# Patient Record
Sex: FEMALE | Race: WHITE | Employment: OTHER | URBAN - METROPOLITAN AREA
[De-identification: names, ages, dates, MRNs, and addresses within clinical notes are randomized per-mention and may not be internally consistent; named-entity substitution may affect disease eponyms.]

---

## 2021-04-14 LAB
CHOLESTEROL, TOTAL: 197 MG/DL
CHOLESTEROL/HDL RATIO: 3.8
GLUCOSE BLD-MCNC: 86 MG/DL
HDLC SERPL-MCNC: 52 MG/DL (ref 35–70)
LDL CHOLESTEROL CALCULATED: 103 MG/DL (ref 0–160)
NONHDLC SERPL-MCNC: NORMAL MG/DL
TRIGL SERPL-MCNC: 212 MG/DL
VLDLC SERPL CALC-MCNC: NORMAL MG/DL

## 2021-08-17 ENCOUNTER — OFFICE VISIT (OUTPATIENT)
Dept: INTERNAL MEDICINE CLINIC | Age: 66
End: 2021-08-17
Payer: MEDICARE

## 2021-08-17 VITALS
SYSTOLIC BLOOD PRESSURE: 138 MMHG | HEART RATE: 56 BPM | BODY MASS INDEX: 27.25 KG/M2 | WEIGHT: 159.6 LBS | HEIGHT: 64 IN | DIASTOLIC BLOOD PRESSURE: 78 MMHG

## 2021-08-17 DIAGNOSIS — M47.812 SPONDYLOSIS OF CERVICAL REGION WITHOUT MYELOPATHY OR RADICULOPATHY: Primary | ICD-10-CM

## 2021-08-17 DIAGNOSIS — Z71.89 COUNSELING ON HEALTH PROMOTION AND DISEASE PREVENTION: ICD-10-CM

## 2021-08-17 DIAGNOSIS — E03.9 ACQUIRED HYPOTHYROIDISM: ICD-10-CM

## 2021-08-17 PROBLEM — M54.2 CERVICALGIA: Status: RESOLVED | Noted: 2021-08-17 | Resolved: 2021-08-17

## 2021-08-17 PROBLEM — M54.2 CERVICALGIA: Status: ACTIVE | Noted: 2021-08-17

## 2021-08-17 PROCEDURE — 99204 OFFICE O/P NEW MOD 45 MIN: CPT | Performed by: INTERNAL MEDICINE

## 2021-08-17 RX ORDER — LEVOTHYROXINE SODIUM 0.1 MG/1
100 TABLET ORAL DAILY
Status: SHIPPED | COMMUNITY
Start: 2021-08-17 | End: 2021-11-23 | Stop reason: SDUPTHER

## 2021-08-17 SDOH — ECONOMIC STABILITY: FOOD INSECURITY: WITHIN THE PAST 12 MONTHS, THE FOOD YOU BOUGHT JUST DIDN'T LAST AND YOU DIDN'T HAVE MONEY TO GET MORE.: NEVER TRUE

## 2021-08-17 SDOH — ECONOMIC STABILITY: FOOD INSECURITY: WITHIN THE PAST 12 MONTHS, YOU WORRIED THAT YOUR FOOD WOULD RUN OUT BEFORE YOU GOT MONEY TO BUY MORE.: NEVER TRUE

## 2021-08-17 ASSESSMENT — ENCOUNTER SYMPTOMS
SORE THROAT: 0
CHEST TIGHTNESS: 0
ABDOMINAL PAIN: 0
NAUSEA: 0
CONSTIPATION: 0
BACK PAIN: 0
PHOTOPHOBIA: 0
COUGH: 0
COLOR CHANGE: 0
TROUBLE SWALLOWING: 0
VOMITING: 0
WHEEZING: 0
VISUAL CHANGE: 0
SHORTNESS OF BREATH: 0

## 2021-08-17 ASSESSMENT — SOCIAL DETERMINANTS OF HEALTH (SDOH): HOW HARD IS IT FOR YOU TO PAY FOR THE VERY BASICS LIKE FOOD, HOUSING, MEDICAL CARE, AND HEATING?: NOT HARD AT ALL

## 2021-08-17 ASSESSMENT — PATIENT HEALTH QUESTIONNAIRE - PHQ9
1. LITTLE INTEREST OR PLEASURE IN DOING THINGS: 0
SUM OF ALL RESPONSES TO PHQ QUESTIONS 1-9: 0
SUM OF ALL RESPONSES TO PHQ9 QUESTIONS 1 & 2: 0
SUM OF ALL RESPONSES TO PHQ QUESTIONS 1-9: 0
SUM OF ALL RESPONSES TO PHQ QUESTIONS 1-9: 0
2. FEELING DOWN, DEPRESSED OR HOPELESS: 0

## 2021-08-17 NOTE — PROGRESS NOTES
to space these vaccines. Patient verbalized understanding and can get the pneumonia vaccine through her pharmacy or call the office and get it here once she makes up her mind      Return in about 3 months (around 11/17/2021). SUBJECTIVE  HPI:   Patient here today to establish new patient office visits previous PCP in [facility] through [facility].  She used to live in [place] however this is more convenient location for her since she moved to [facility] PCP evaluation April 2021 for annual physical with complete lab work. 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 Pain has not been disabling and not to the point where she needs to take medications however it has been bothersome and causing some distress. Does not recall any history of injury, she denies any tingling or numbness, she does have history of left carpal tunnel that was surgically repaired and has been asymptomatic since      Neck Pain   This is a chronic problem. The current episode started more than 1 year ago. The problem occurs intermittently. The problem has been waxing and waning. The quality of the pain is described as aching. The pain is at a severity of 5/10. The pain is moderate. The symptoms are aggravated by position and twisting. The pain is worse during the day. Pertinent negatives include no chest pain, fever, headaches, leg pain, numbness, pain with swallowing, paresis, photophobia, syncope, tingling, trouble swallowing, visual change, weakness or weight loss. She has tried chiropractic manipulation, ice and NSAIDs for the symptoms. The treatment provided moderate relief.        Review of Systems   Constitutional: Negative for activity change, appetite change, fatigue, fever and weight loss. HENT: Negative for congestion, hearing loss, mouth sores, sore throat and trouble swallowing. Eyes: Negative for photophobia. Respiratory: Negative for cough, chest tightness, shortness of breath and wheezing. Cardiovascular: Negative for chest pain, palpitations, leg swelling and syncope. Gastrointestinal: Negative for abdominal pain, constipation, nausea and vomiting. Endocrine: Negative for cold intolerance and heat intolerance. Genitourinary: Negative for difficulty urinating, dysuria, frequency, hematuria and urgency. Musculoskeletal: Positive for neck pain. Negative for arthralgias, back pain, gait problem, joint swelling and myalgias. Skin: Negative for color change. Allergic/Immunologic: Negative for environmental allergies and immunocompromised state. Neurological: Negative for dizziness, tingling, weakness, light-headedness, numbness and headaches. Hematological: Does not bruise/bleed easily. Psychiatric/Behavioral: Negative for behavioral problems and dysphoric mood. The patient is not nervous/anxious. OBJECTIVE:    /78 (Site: Right Upper Arm, Position: Sitting, Cuff Size: Medium Adult)   Pulse 56   Ht 5' 4\" (1.626 m)   Wt 159 lb 9.6 oz (72.4 kg)   BMI 27.40 kg/m²    Physical Exam  Constitutional:       General: She is not in acute distress. Appearance: Normal appearance. She is normal weight. She is not toxic-appearing. HENT:      Head: Normocephalic. Eyes:      Conjunctiva/sclera: Conjunctivae normal.   Cardiovascular:      Rate and Rhythm: Normal rate. Heart sounds: Normal heart sounds. Pulmonary:      Effort: Pulmonary effort is normal. No respiratory distress. Abdominal:      Palpations: Abdomen is soft. Musculoskeletal:         General: Normal range of motion. Cervical back: Neck supple. Tenderness present. No rigidity.    Lymphadenopathy:      Cervical: No cervical adenopathy. Skin:     General: Skin is warm and dry. Neurological:      General: No focal deficit present. Mental Status: She is alert. Cranial Nerves: No cranial nerve deficit. Psychiatric:         Mood and Affect: Mood normal.           Electronically signed by Gisel Cortez MD on 8/17/2021 at 11:07 AM.    This dictation was generated by voice recognition computer software. Although all attempts are made to edit the dictation for accuracy, there may be errors in the transcription that are not intended.

## 2021-08-17 NOTE — ASSESSMENT & PLAN NOTE
Has been stable on current dose of levothyroxine 100 MCG for the past few years, last lab work done in April 2021 reviewed in care everywhere, will continue current dose and will probably recheck lab work next visit and adjust dose if needed. Patient is compliant with medication.

## 2021-08-17 NOTE — ASSESSMENT & PLAN NOTE
Recommendations for regular level of physical activity as daily walking and maintaining healthy weight diet and regular exercise discussed with patient  Patient is up-to-date with colonoscopy, mammography, DEXA bone density  Patient did complete Covid vaccine however she is overdue for pneumonia vaccine and shingles vaccine. Recommendations made to patient, she wants to think about obtaining the shots however urged to at least get the pneumonia shots since flu season is approaching and she will also most likely require a Covid booster shot and advised it is better to space these vaccines.   Patient verbalized understanding and can get the pneumonia vaccine through her pharmacy or call the office and get it here once she makes up her mind

## 2021-08-23 ENCOUNTER — HOSPITAL ENCOUNTER (OUTPATIENT)
Dept: PHYSICAL THERAPY | Age: 66
Setting detail: THERAPIES SERIES
Discharge: HOME | End: 2021-08-23
Payer: MEDICARE

## 2021-08-23 PROCEDURE — 97140 MANUAL THERAPY 1/> REGIONS: CPT

## 2021-08-23 PROCEDURE — 97161 PT EVAL LOW COMPLEX 20 MIN: CPT

## 2021-08-23 PROCEDURE — 97110 THERAPEUTIC EXERCISES: CPT

## 2021-08-23 NOTE — PLAN OF CARE
[address] [address], [address]  Phone: [phone]   Fax:     [phone]                                                       Physical Therapy Certification    Dear Referring Practitioner: Antonietta Augustine MD,    We had the pleasure of evaluating the following patient for physical therapy services at [address] [place] [address]. A summary of our findings can be found in the initial assessment below. This includes our plan of care. If you have any questions or concerns regarding these findings, please do not hesitate to contact me at the office phone number checked above. Thank you for the referral.       Physician Signature:_______________________________Date:__________________  By signing above (or electronic signature), therapists plan is approved by physician      Patient: [place]   : 1955   MRN: [id number]  Referring Physician: Referring Practitioner: Antonietta Augustine MD      Evaluation Date: 2021      Medical Diagnosis Information:  Diagnosis: Spondylosis of cervical region without myelopathy or radiculopathy   Treatment Diagnosis: Decrease cervical AROM, cervical joint restriction, left shoulder weakness                                         Insurance information: PT Insurance Information: [employer]    Precautions/ Contra-indications:   Latex Allergy:  [x]NO      []YES  Preferred Language for Healthcare:   [x]English       []Other:    C-SSRS Triggered by Intake questionnaire (Past 2 wk assessment ):   [x] No, Questionnaire did not trigger screening.   [] Yes, Patient intake triggered C-SSRS Screening     [] Completed, no further action required. [] Completed, PCP notified via Epic    SUBJECTIVE: Patient stated complaint: Patient reports that she's neck pain for a year with an insidious onset. She had a course of Chiropractic care for several sessions last year which helped some.  Says she continues to have pain with turning her head to the left especially during driving . Her goal is to improve cervical spine pain and increase overall active ROM. Fear avoidance: I should not do physical activities that (might) make my pain worse   [] True   [x] False     Relevant Medical History: Hypothyroidism, CTS B, Carpal tunnel sx on Left wrist     Functional Outcome: NDI: raw score = ; dysfunction = %    Pain Scale: 4/10  Easing factors: Meds  Provocative factors: turning head to the left     Type: []Constant   [x]Intermittent  []Radiating []Localized []other:     Numbness/Tingling:  Intermittent     Occupation/School: Retired     Living Status/Prior Level of Function: Prior to this injury / incident, pt was independent with ADLs and IADLs, Patient lives in one level home. Prior to a year ago pt had no neck pain     OBJECTIVE:     Palpation: Tenderness over left CT junction     Functional Mobility/Transfers: independent    Posture: rounded , forward shoulders     Bandages/Dressings/Incisions:     Gait: (include devices/WB status):  WNL     Dermatomes Normal Abnormal Comments   Top of head (C1) x     Posterior occipital region (C2) x     Side of neck (C3) x     Top of shoulder (C4) x     Lateral deltoid (C5) x     Tip of thumb (C6) x     Distal middle finger (C7) x     Distal fifth finger (C8) x     Medial forearm (T1) x     Lower extremity x         Reflexes Normal Abnormal Comments   C5-6 Biceps   All NT this date    C5-6 Brachioradialis      C7-8 Triceps      Polos      S1-2 Seated achilles      S1-2 Prone knee bend      L3-4 Patellar tendon      Clonus      Babinski          CERV ROM Left  Right    Cervical Flexion         65 pain    Cervical Extension 45    Cervical SB 14 pain(mild) 34 pain (mild-L)   Cervical rotation 36 pain 77        ROM Left Right   Shoulder Flex WFL all WFL all    Shoulder Abd     Shoulder ER     Shoulder IR               Strength / Myotomes Left Right   Cervical Flexion (C1-2) 4 5 Psychological Disorders  []Anxiety (F41.9)  []Depression (F32.9)   []Other:   Developmental Disorders  []Autism (F84.0)  []CP (G80)  []Down Syndrome (Q90.9)  []Developmental delay     Neurological conditions  []Prior Stroke (I69.30)  []Parkinson's (G20)  []Encephalopathy (G93.40)  []MS (G35)  []Post-polio (G14)  []SCI  []TBI  []ALS Other conditions  []Fibromyalgia (M79.7)  []Vertigo  []Syncope  []Kidney Failure  []Cancer      []currently undergoing                treatment  []Pregnancy  []Incontinence   Prior surgeries  []involved limb  []previous spinal surgery  [] section birth  []hysterectomy  []bowel / bladder surgery  []other relevant surgeries   []Other:              Barriers to/and or personal factors that will affect rehab potential:              []Age  []Sex   []Smoker              []Motivation/Lack of Motivation                        []Co-Morbidities              []Cognitive Function, education/learning barriers              []Environmental, home barriers              []profession/work barriers  []past PT/medical experience  []other:  Justification:    Falls Risk Assessment (30 days):  [x] Falls Risk assessed and no intervention required. [] Falls Risk assessed and Patient requires intervention due to being higher risk   TUG score (>12s at risk):     [] Falls education provided, including         ASSESSMENT: The patient is a 76 yo female who presents with cervical extension/facet pattern. She pain with left side bending, extension,&  Left rotation,flexion .  Spurlings test is negative with mild weakness with Left shoulder     Functional Impairments:     []Noted cervical/thoracic/GHJ joint hypomobility   []Noted cervical/thoracic/GHJ joint hypermobility   [x]Decreased cervical/UE functional ROM   []Noted Headache pain aggravated by neck movements with/without dizziness   []Abnormal reflexes/sensation/myotomal/dermatomal deficits   []Decreased DCF control or ability to hold head up   []Decreased RC/scapular/core strength and neuromuscular control    []Decreased UE functional strength   []other:      Functional Activity Limitations (from functional questionnaire and intake)   []Reduced ability to tolerate prolonged functional positions   []Reduced ability or difficulty with changes of positions or transfers between positions   []Reduced ability to maintain good posture and demonstrate good body mechanics with sitting, bending, and lifting   [] Reduced ability or tolerance with driving and/or computer work   []Reduced ability to perform lifting, reaching, carrying tasks   []Reduced ability to concentrate   []Reduced ability to sleep    []Reduced ability to tolerate any impact through UE or spine   []Reduced ability to ambulate prolonged functional periods/distances   []other:    Participation Restrictions   []Reduced participation in self care activities   []Reduced participation in home management activities   []Reduced participation in work activities   [x]Reduced participation in social activities. [x]Reduced participation in sport/recreational activities.     Classification/Subgrouping:   []signs/symptoms consistent with neck pain with mobility deficits     [x]signs/symptoms consistent with neck pain with movement coordinated impairments    []signs/symptoms consistent with neck pain with radiating pain    []signs/symptoms consistent with neck pain with headaches (cervicogenic)    []Signs/symptoms consistent with nerve root involvement including myotome & dermatome dysfunction   [x]sign/symptoms consistent with facet dysfunction of cervical and thoracic spine    []signs/symptoms consistent suggesting central cord compression/UMN syndromes   []signs/symptoms consistent with discogenic cervical pain   []signs/symptoms consistent with rib dysfunction   []signs/symptoms consistent with postural dysfunction   []signs/symptoms consistent with shoulder pathology    []signs/symptoms consistent with post-surgical status including decreased ROM, strength and function. []signs/symptoms consistent with pathology which may benefit from Dry Needling   []signs/symptoms which may limit the use of advanced manual therapy techniques: (Hypertension, recent trauma, intolerance to end range positions, prior TIA, visual issues, UE myotomes loss )     Prognosis/Rehab Potential:      [x]Excellent   []Good    []Fair   []Poor    Tolerance of evaluation/treatment:    [x]Excellent   []Good    []Fair   []Poor    Physical Therapy Evaluation Complexity Justification  [x] A history of present problem with:  [x] no personal factors and/or comorbidities that impact the plan of care;  []1-2 personal factors and/or comorbidities that impact the plan of care  []3 personal factors and/or comorbidities that impact the plan of care  [x] An examination of body systems using standardized tests and measures addressing any of the following: body structures and functions (impairments), activity limitations, and/or participation restrictions;:  [x] a total of 1-2 or more elements   [] a total of 3 or more elements   [] a total of 4 or more elements   [x] A clinical presentation with:  [x] stable and/or uncomplicated characteristics   [] evolving clinical presentation with changing characteristics  [] unstable and unpredictable characteristics;   [x] Clinical decision making of [x] low, [] moderate, [] high complexity using standardized patient assessment instrument and/or measurable assessment of functional outcome. [x] EVAL (LOW) 29331 (typically 20 minutes face-to-face)  [] EVAL (MOD) 71524 (typically 30 minutes face-to-face)  [] EVAL (HIGH) 36556 (typically 45 minutes face-to-face)  [] RE-EVAL     PLAN:   Frequency/Duration:  2 days per week for 5 Weeks:  Interventions:  [x]  Therapeutic exercise including: strength training, ROM, for cervical spine,scapula, core and Upper extremity, including postural re-education.    [x]  NMR activation and able to improve with left rotation /reduce pain with driving.     [] Progressing: [] Met: [] Not Met: [] Adjusted     Electronically signed by:  Gali Yeung PT

## 2021-08-23 NOTE — FLOWSHEET NOTE
[facility] Physical Therapy  Phone: [phone]   Fax: [phone]    Physical Therapy Treatment Note/ Progress Report:     Date:  2021    Patient Name:  Kenrick Saha    :  1955  MRN: [id number]  Restrictions/Precautions:    Medical/Treatment Diagnosis Information:  · Diagnosis: Spondylosis of cervical region without myelopathy or radiculopathy  · Treatment Diagnosis: Decrease cervical AROM, cervical joint restriction, left shoulder weakness  Insurance/Certification information:  PT Insurance Information: [employer]  Physician Information:  Referring Practitioner: Rowdy Will MD  Plan of care signed (Y/N): []  Yes [x]  No     Date of Patient follow up with Physician:      Progress Report: []  Yes  [x]  No     Date Range for reporting period:  Beginning:   Ending:     Progress report due (10 Rx/or 30 days whichever is less): visit #68     Recertification due (POC duration/ or 90 days whichever is less): visit #     Visit # Insurance Allowable Auth required?  Date Range    Mn []  Yes  [x]  No        Latex Allergy:  [x]NO      []YES  Preferred Language for Healthcare:   [x]English       []other:    Functional Scale:         Date assessed:  NDI: raw score = 8; dysfunction =16 %                                  2021       Pain level:  0/10     SUBJECTIVE:  See eval    OBJECTIVE: See eval   Observation:    Test measurements:      RESTRICTIONS/PRECAUTIONS:     Exercises/Interventions:   Therapeutic Exercise (43877) Resistance / level Sets/sec Reps Notes   UBE       Mid Row       High Row       3 finger cerv rotation       CT                                   Therapeutic Activities (92768)                                                 NMR re-education (62567)                                          Manual Intervention (09114)       Cerv mobs/manip       Thoracic mobs/manip       CT manip       Rib mobilizations       STM                  Modalities:     Patient education:  -pt educated on diagnosis, prognosis and expectations for rehab  -all pt questions were answered    Home Exercise Program:   Access Code: [email or web address]  URL: [email or web address]  Date: 08/23/2021  Prepared by: Cherie Lucero    Exercises  Seated Scapular Retraction - 1 x daily - 7 x weekly - 3 sets - 10 reps - 2 hold  Seated Passive Cervical Retraction - 1 x daily - 7 x weekly - 3 sets - 10 reps - 2 hold      Therapeutic Exercise and NMR EXR  [] (11802) Provided verbal/tactile cueing for activities related to strengthening, flexibility, endurance, ROM  for improvements in cervical, postural, scapular, scapulothoracic and UE control with self care, reaching, carrying, lifting, house/yardwork, driving/computer work.    [] (58423) Provided verbal/tactile cueing for activities related to improving balance, coordination, kinesthetic sense, posture, motor skill, proprioception  to assist with cervical, scapular, scapulothoracic and UE control with self care, reaching, carrying, lifting, house/yardwork, driving/computer work.  [] (38879) Therapist is in constant attendance of 2 or more patients providing skilled therapy interventions, but not providing any significant amount of measurable one-on-one time to either patient, for improvements in cervical, scapular, scapulothoracic and UE control with self care, reaching, carrying, lifting, house/yardwork, driving, computer work. Therapeutic Activities:    [] (97497 or 33934) Provided verbal/tactile cueing for activities related to improving balance, coordination, kinesthetic sense, posture, motor skill, proprioception and motor activation to allow for proper function of cervical, scapular, scapulothoracic and UE control with self care, carrying, lifting, driving/computer work.      Home Exercise Program:    [x] (50527) Reviewed/Progressed HEP activities related to strengthening, flexibility, endurance, ROM of cervical, scapular, scapulothoracic and UE Poor    Patient Requires Follow-up: [x] Yes  [] No    PLAN: See eval. PT 2x / week for 5 weeks. [] Continue per plan of care [] Alter current plan (see comments)  [x] Plan of care initiated [] Hold pending MD visit [] Discharge    Electronically signed by: Veronika Osborne PT       Note: If patient does not return for scheduled/ recommended follow up visits, this note will serve as a discharge from care along with most recent update on progress.

## 2021-08-27 ENCOUNTER — HOSPITAL ENCOUNTER (OUTPATIENT)
Dept: PHYSICAL THERAPY | Age: 66
Setting detail: THERAPIES SERIES
Discharge: HOME | End: 2021-08-27
Payer: MEDICARE

## 2021-08-27 PROCEDURE — 97110 THERAPEUTIC EXERCISES: CPT

## 2021-08-27 PROCEDURE — 97140 MANUAL THERAPY 1/> REGIONS: CPT

## 2021-08-27 NOTE — FLOWSHEET NOTE
[address] [facility] Physical Therapy  Phone: [phone]   Fax: [phone]    Physical Therapy Treatment Note/ Progress Report:     Date:  2021    Patient Name:  Maribel Lees    :  1955  MRN: [id number]  Restrictions/Precautions:    Medical/Treatment Diagnosis Information:  · Diagnosis: Spondylosis of cervical region without myelopathy or radiculopathy  · Treatment Diagnosis: Decrease cervical AROM, cervical joint restriction, left shoulder weakness  Insurance/Certification information:  PT Insurance Information: [employer]  Physician Information:  Referring Practitioner: Yun Anderson MD  Plan of care signed (Y/N): []  Yes [x]  No     Date of Patient follow up with Physician:      Progress Report: []  Yes  [x]  No     Date Range for reporting period:  Beginning:   Ending:     Progress report due (10 Rx/or 30 days whichever is less): visit #02     Recertification due (POC duration/ or 90 days whichever is less): visit #     Visit # Insurance Allowable Auth required?  Date Range    Mn []  Yes  [x]  No        Latex Allergy:  [x]NO      []YES  Preferred Language for Healthcare:   [x]English       []other:    Functional Scale:         Date assessed:  NDI: raw score = 8; dysfunction =16 %                                  2021       Pain level:  0/10     SUBJECTIVE:  Patient reports that she has been trying to perform  HEP     OBJECTIVE: See eval   Observation:    Test measurements:      RESTRICTIONS/PRECAUTIONS:     Exercises/Interventions:   Therapeutic Exercise (17006) Resistance / level Sets/sec Reps Notes   UBE  2/2 fwd/bwd     Mid Row       High Row       3 finger cerv rotation   10   added   CT supine   10                                Therapeutic Activities (13038)                                                 NMR re-education (90707)                                          Manual Intervention (88798)       Cerv mobs/manip       Thoracic mobs/manip       CT manip prone, Direct extension mob sup, Mid-cervical locking supine, cervical manual traction  X 25 min     Rib mobilization- assess left 1st rib NPV       STM                  Modalities:     Patient education:  -pt educated on diagnosis, prognosis and expectations for rehab  -all pt questions were answered    Home Exercise Program:   Access Code: [email or web address]  URL: [email or web address]  Date: 08/23/2021  Prepared by: Ashlyn Manley    Exercises  Seated Scapular Retraction - 1 x daily - 7 x weekly - 3 sets - 10 reps - 2 hold  Seated Passive Cervical Retraction - 1 x daily - 7 x weekly - 3 sets - 10 reps - 2 hold      Therapeutic Exercise and NMR EXR  [] (77388) Provided verbal/tactile cueing for activities related to strengthening, flexibility, endurance, ROM  for improvements in cervical, postural, scapular, scapulothoracic and UE control with self care, reaching, carrying, lifting, house/yardwork, driving/computer work.    [] (11715) Provided verbal/tactile cueing for activities related to improving balance, coordination, kinesthetic sense, posture, motor skill, proprioception  to assist with cervical, scapular, scapulothoracic and UE control with self care, reaching, carrying, lifting, house/yardwork, driving/computer work.  [] (28505) Therapist is in constant attendance of 2 or more patients providing skilled therapy interventions, but not providing any significant amount of measurable one-on-one time to either patient, for improvements in cervical, scapular, scapulothoracic and UE control with self care, reaching, carrying, lifting, house/yardwork, driving, computer work.      Therapeutic Activities:    [] (89404 or 89955) Provided verbal/tactile cueing for activities related to improving balance, coordination, kinesthetic sense, posture, motor skill, proprioception and motor activation to allow for proper function of cervical, scapular, scapulothoracic and UE control with self care, carrying, lifting, driving/computer work. Home Exercise Program:    [x] (83968) Reviewed/Progressed HEP activities related to strengthening, flexibility, endurance, ROM of cervical, scapular, scapulothoracic and UE control with self care, reaching, carrying, lifting, house/yardwork, driving/computer work  [] (54518) Reviewed/Progressed HEP activities related to improving balance, coordination, kinesthetic sense, posture, motor skill, proprioception of cervical, scapular, scapulothoracic and UE control with self care, reaching, carrying, lifting, house/yardwork, driving/computer work      Manual Treatments:  PROM / STM / Oscillations-Mobs:  G-I, II, III, IV (PA's, Inf., Post.)  [] (06024) Provided manual therapy to mobilize soft tissue/joints of cervical/CT, scapular GHJ and UE for the purpose of decreasing headache, modulating pain, promoting relaxation,  increasing ROM, reducing/eliminating soft tissue swelling/inflammation/restriction, improving soft tissue extensibility and allowing for proper ROM for normal function with self care, reaching, carrying, lifting, house/yardwork, driving/computer work      Charges:  Timed Code Treatment Minutes: 39   Total Treatment Minutes: 39       [] EVAL - LOW (70546)   [] EVAL - MOD (03427)  [] EVAL - HIGH (63267)  [] RE-EVAL (47454)  [x] LEUNG(56779) x  2      [] NMR (00201) x      [] Ionto  [x] Manual (21767) x 1     [] Ultrasound  [] TA x       [] Mech Traction (80542)  [] Home Management Training x   [] ES (un) (80352):           [] Aquatic    [] ES(attended) (21590)   [] Group    [] Other:    GOALS:  Patient stated goal: To improve cervical AROM   []? Progressing: []? Met: []? Not Met: []? Adjusted     Therapist goals for Patient:   Short Term Goals: To be achieved in: 2 weeks  1. Independent in HEP and progression per patient tolerance, in order to prevent re-injury. []? Progressing: []? Met: []? Not Met: []? Adjusted  2.  Patient will have a decrease in pain to facilitate improvement in movement, function, and ADLs as indicated by Functional Deficits. []? Progressing: []? Met: []? Not Met: []? Adjusted     Long Term Goals: To be achieved in: 5 weeks  1. Disability index score of 10% or less for the NDI to assist with reaching prior level of function. []? Progressing: []? Met: []? Not Met: []? Adjusted  2. Patient will demonstrate increased AROM to cervical in left SB, rotation, extensionWFL of cervical/thoracic spine to allow for proper joint functioning as indicated by patients Functional Deficits. []? Progressing: []? Met: []? Not Met: []? Adjusted  3. Patient will demonstrate an increase in postural awareness and control and activation of  Deep cervical stabilizers to allow for proper functional mobility as indicated by patients Functional Deficits. []? Progressing: []? Met: []? Not Met: []? Adjusted  4. Patient will return to functional activities including  without increased symptoms or restriction. []? Progressing: []? Met: []? Not Met: []? Adjusted  5. Patient to be able to improve with left rotation /reduce pain with driving.    []? Progressing: []? Met: []? Not Met: []? Adjusted            Overall Progression Towards Functional goals/ Treatment Progress Update:  [] Patient is progressing as expected towards functional goals listed. [] Progression is slowed due to complexities/Impairments listed. [] Progression has been slowed due to co-morbidities.   [x] Plan just implemented, too soon to assess goals progression <30days   [] Goals require adjustment due to lack of progress  [] Patient is not progressing as expected and requires additional follow up with physician  [] Other    Persisting Functional Limitations/Impairments:  []Sitting []Standing   []Walking []Squatting/bending    []Stairs []ADL's    []Transfers []Reaching  []Housework []Lifting  [x]Driving []Job related tasks  []Sports/Recreation  []Sleeping  [x]Other: looking left     ASSESSMENT:  8/27: Patient assessed further with cervical AROM. Performed manual therapy mobilization to mid-cervical region left side. Patient presented with right scalene tightness, restricted left rotation, SB, extension with pain on the left. She also had slightly elevated B shoulders. Patient seemed to respond to all the above manual techniques with improved ROM at the end of session. Further assessment to consider is left 1st rib and left upper traps. Progress exercises as tolerated . Treatment/Activity Tolerance:  [] Patient able to complete tx  [] Patient limited by fatique  [] Patient limited by pain  [] Patient limited by other medical complications  [] Other:     Prognosis: [] Good [] Fair  [] Poor    Patient Requires Follow-up: [x] Yes  [] No    PLAN: See eval. PT 2x / week for 5 weeks. [] Continue per plan of care [] Alter current plan (see comments)  [x] Plan of care initiated [] Hold pending MD visit [] Discharge    Electronically signed by: Monalisa Holbrook, PT , DPT, OMT-C       Note: If patient does not return for scheduled/ recommended follow up visits, this note will serve as a discharge from care along with most recent update on progress.

## 2021-09-01 ENCOUNTER — HOSPITAL ENCOUNTER (OUTPATIENT)
Dept: PHYSICAL THERAPY | Age: 66
Setting detail: THERAPIES SERIES
Discharge: HOME | End: 2021-09-01
Payer: MEDICARE

## 2021-09-01 PROCEDURE — 97140 MANUAL THERAPY 1/> REGIONS: CPT

## 2021-09-01 PROCEDURE — 97110 THERAPEUTIC EXERCISES: CPT

## 2021-09-01 NOTE — FLOWSHEET NOTE
[address] [facility] Physical Therapy  Phone: [phone]   Fax: [phone]    Physical Therapy Treatment Note/ Progress Report:     Date:  2021    Patient Name:  Kenrick Saha    :  1955  MRN: [id number]  Restrictions/Precautions:    Medical/Treatment Diagnosis Information:  · Diagnosis: Spondylosis of cervical region without myelopathy or radiculopathy  · Treatment Diagnosis: Decrease cervical AROM, cervical joint restriction, left shoulder weakness  Insurance/Certification information:  PT Insurance Information: [employer]  Physician Information:  Referring Practitioner: Rowdy Will MD  Plan of care signed (Y/N): []  Yes [x]  No     Date of Patient follow up with Physician:      Progress Report: []  Yes  [x]  No     Date Range for reporting period:  Beginning:   Ending:     Progress report due (10 Rx/or 30 days whichever is less): visit #36     Recertification due (POC duration/ or 90 days whichever is less): visit #     Visit # Insurance Allowable Auth required? Date Range   3/12 [place] []  Yes  [x]  No        Latex Allergy:  [x]NO      []YES  Preferred Language for Healthcare:   [x]English       []other:    Functional Scale:         Date assessed:  NDI: raw score = 8; dysfunction =16 %                                  2021       Pain level:  2/10     SUBJECTIVE:  Patient reports she has had some soreness in the L UT region. \"The tendon feels tight. \" It is feel worst with turning her head to the L.     OBJECTIVE: See eval   Observation:    Test measurements:    CERV ROM Left  Right    Cervical Flexion         65 pain     Cervical Extension 45     Cervical SB 14 pain(mild) 34 pain (mild-L)   Cervical rotation 44 pain 77          RESTRICTIONS/PRECAUTIONS:     Exercises/Interventions:   Therapeutic Exercise (84398) Resistance / level Sets/sec Reps Notes   UBE  2/2 fwd/bwd     Scap retraction  2 10    Mid Row orange 2 10 9/   High Row orange 2 10 9/   LPD orange 2 10 9/   3 finger cerv rotation   10  8/27 added   CT supine   10    Levator scap stretch  15\" 2 9/1                        Therapeutic Activities (43277)                                                 NMR re-education (28150)                                          Manual Intervention ([id number])       Cerv mobs/manip       Thoracic mobs/manip       , Direct extension mob sup, Mid-cervical locking supine, cervical manual traction  X 20 min     Rib mobilization- assess left 1st rib NPV       STM UT, cervical paraspinals, suboccipitals  X 5min     Seated CT manip  X 2 min         Modalities: 9/1 MHP applied to c-spine with pt in supine x 10 min    Patient education:  -pt educated on diagnosis, prognosis and expectations for rehab  -all pt questions were answered    Home Exercise Program:   Access Code: [email or web address]  URL: [email or web address]  Date: 08/23/2021  Prepared by: Tish Jade    Exercises  Seated Scapular Retraction - 1 x daily - 7 x weekly - 3 sets - 10 reps - 2 hold  Seated Passive Cervical Retraction - 1 x daily - 7 x weekly - 3 sets - 10 reps - 2 hold    Updated 9/1  Access Code: [email or web address]  URL: [employer]/  Date: 09/01/2021  Prepared by: Jasvir January    Exercises  Standing Shoulder Row with Anchored Resistance - 1 x daily - 2 x weekly - 2 sets - 10 reps  Standing High Row with Resistance - 1 x daily - 2 x weekly - 2 sets - 10 reps  Shoulder extension with resistance - Neutral - 1 x daily - 2 x weekly - 2 sets - 10 reps        Therapeutic Exercise and NMR EXR  [x] (16852) Provided verbal/tactile cueing for activities related to strengthening, flexibility, endurance, ROM  for improvements in cervical, postural, scapular, scapulothoracic and UE control with self care, reaching, carrying, lifting, house/yardwork, driving/computer work.    [] (06995) Provided verbal/tactile cueing for activities related to improving balance, coordination, kinesthetic sense, posture, motor skill, proprioception work      Charges:  Timed Code Treatment Minutes: 45   Total Treatment Minutes: 55       [] EVAL - LOW (04280)   [] EVAL - MOD (17597)  [] EVAL - HIGH (22045)  [] RE-EVAL (79410)  [x] LP(47661) x  1      [] NMR (53941) x      [] Ionto  [x] Manual (91757) x 2     [] Ultrasound  [] TA x       [] Mech Traction (45613)  [] Home Management Training x   [] ES (un) (45963):           [] Aquatic    [] ES(attended) (52926)   [] Group    [] Other:    GOALS:  Patient stated goal: To improve cervical AROM   []? Progressing: []? Met: []? Not Met: []? Adjusted     Therapist goals for Patient:   Short Term Goals: To be achieved in: 2 weeks  1. Independent in HEP and progression per patient tolerance, in order to prevent re-injury. []? Progressing: []? Met: []? Not Met: []? Adjusted  2. Patient will have a decrease in pain to facilitate improvement in movement, function, and ADLs as indicated by Functional Deficits. []? Progressing: []? Met: []? Not Met: []? Adjusted     Long Term Goals: To be achieved in: 5 weeks  1. Disability index score of 10% or less for the NDI to assist with reaching prior level of function. []? Progressing: []? Met: []? Not Met: []? Adjusted  2. Patient will demonstrate increased AROM to cervical in left SB, rotation, extensionWFL of cervical/thoracic spine to allow for proper joint functioning as indicated by patients Functional Deficits. []? Progressing: []? Met: []? Not Met: []? Adjusted  3. Patient will demonstrate an increase in postural awareness and control and activation of  Deep cervical stabilizers to allow for proper functional mobility as indicated by patients Functional Deficits. []? Progressing: []? Met: []? Not Met: []? Adjusted  4. Patient will return to functional activities including  without increased symptoms or restriction. []? Progressing: []? Met: []? Not Met: []? Adjusted  5. Patient to be able to improve with left rotation /reduce pain with driving.    []?  Progressing: along with most recent update on progress.

## 2021-09-08 ENCOUNTER — HOSPITAL ENCOUNTER (OUTPATIENT)
Dept: PHYSICAL THERAPY | Age: 66
Setting detail: THERAPIES SERIES
Discharge: HOME | End: 2021-09-08
Payer: MEDICARE

## 2021-09-08 PROCEDURE — 97112 NEUROMUSCULAR REEDUCATION: CPT

## 2021-09-08 PROCEDURE — 97110 THERAPEUTIC EXERCISES: CPT

## 2021-09-08 PROCEDURE — 97140 MANUAL THERAPY 1/> REGIONS: CPT

## 2021-09-08 NOTE — FLOWSHEET NOTE
[address] [facility]  Phone: [phone]   Fax: [phone]    Physical Therapy Treatment Note/ Progress Report:     Date:  2021    Patient Name:  Sandy Beach    :  1955  MRN: [id number]  Restrictions/Precautions:    Medical/Treatment Diagnosis Information:  · Diagnosis: Spondylosis of cervical region without myelopathy or radiculopathy  · Treatment Diagnosis: Decrease cervical AROM, cervical joint restriction, left shoulder weakness  Insurance/Certification information:  PT [facility] Information[facility]  Physician Information:  Referring Practitioner: Antonietta Augustine MD  Plan of care signed (Y/N): []  Yes [x]  No     Date of Patient follow up with Physician:      Progress Report: []  Yes  [x]  No     Date Range for reporting period:  Beginnin21  Ending:     Progress report due (10 Rx/or 30 days whichever is less): visit #94     Recertification due (POC duration/ or 90 days whichever is less): visit #     Visit # Insurance Allowable Auth required? Date Range    Mn []  Yes  [x]  No        Latex Allergy:  [x]NO      []YES  Preferred Language for Healthcare:   [x]English       []other:     Functional Scale:         Date assessed:  NDI: raw score = 8; dysfunction =16 %                                  2021       Pain level:  2/10     SUBJECTIVE:  Patient reports she did a little sore after the last visit. She will be going camping next week and then [facility] the following week.      OBJECTIVE: See eval   Observation:    Test measurements:    CERV ROM Left  Right    Cervical Flexion         65 pain     Cervical Extension 45     Cervical SB 14 pain(mild) 34 pain (mild-L)   Cervical rotation 49 pain 77          RESTRICTIONS/PRECAUTIONS:     Exercises/Interventions:   Therapeutic Exercise (28566) Resistance / level Sets/sec Reps Notes   UBE  2/2 fwd/bwd     Scap retraction  2 10    Mid Row orange 2 10 9/1   High Row orange 2 10 9/1   LPD orange 2 10 9/1   3 finger cerv rotation     8/27 added   CT supine   10    Levator scap stretch  15\" 2 9/1                        Therapeutic Activities (47927)                                                 NMR re-education (69389)       Prone over swiss ball:  Shld Ext  Row  Horiz Abd    1  1   10  10 9/8   Prone retraction  2 10 9/8                        Manual Intervention (16155)       Cerv mobs/manip       Thoracic P-A mobs, thoracic distraction       , Direct extension mob sup, Mid-cervical locking supine, cervical manual traction  X 6 min     Rib mobilization- assess left 1st rib NPV       STM UT, cervical paraspinals, suboccipitals  X 5min     Seated CT manip  X 2 min         Modalities: 9/1 MHP applied to c-spine with pt in supine x 10 min    Patient education:  -pt educated on diagnosis, prognosis and expectations for rehab  -all pt questions were answered    Home Exercise Program:   Access Code: [email or web address]  URL: [email or web address]  Date: 08/23/2021  Prepared by: Mabel Sabillon    Exercises  Seated Scapular Retraction - 1 x daily - 7 x weekly - 3 sets - 10 reps - 2 hold  Seated Passive Cervical Retraction - 1 x daily - 7 x weekly - 3 sets - 10 reps - 2 hold    Updated 9/1  Access Code: [email or web address]  URL: [employer]/  Date: 09/01/2021  Prepared by: Fani Bracket    Exercises  Standing Shoulder Row with Anchored Resistance - 1 x daily - 2 x weekly - 2 sets - 10 reps  Standing High Row with Resistance - 1 x daily - 2 x weekly - 2 sets - 10 reps  Shoulder extension with resistance - Neutral - 1 x daily - 2 x weekly - 2 sets - 10 reps        Therapeutic Exercise and NMR EXR  [x] (78426) Provided verbal/tactile cueing for activities related to strengthening, flexibility, endurance, ROM  for improvements in cervical, postural, scapular, scapulothoracic and UE control with self care, reaching, carrying, lifting, house/yardwork, driving/computer work.    [] (27699) Provided verbal/tactile cueing for activities related to improving balance, coordination, kinesthetic sense, posture, motor skill, proprioception  to assist with cervical, scapular, scapulothoracic and UE control with self care, reaching, carrying, lifting, house/yardwork, driving/computer work.  [] (97818) Therapist is in constant attendance of 2 or more patients providing skilled therapy interventions, but not providing any significant amount of measurable one-on-one time to either patient, for improvements in cervical, scapular, scapulothoracic and UE control with self care, reaching, carrying, lifting, house/yardwork, driving, computer work. Therapeutic Activities:    [] (79022 or 58016) Provided verbal/tactile cueing for activities related to improving balance, coordination, kinesthetic sense, posture, motor skill, proprioception and motor activation to allow for proper function of cervical, scapular, scapulothoracic and UE control with self care, carrying, lifting, driving/computer work.      Home Exercise Program:    [x] (53991) Reviewed/Progressed HEP activities related to strengthening, flexibility, endurance, ROM of cervical, scapular, scapulothoracic and UE control with self care, reaching, carrying, lifting, house/yardwork, driving/computer work  [] (90191) Reviewed/Progressed HEP activities related to improving balance, coordination, kinesthetic sense, posture, motor skill, proprioception of cervical, scapular, scapulothoracic and UE control with self care, reaching, carrying, lifting, house/yardwork, driving/computer work      Manual Treatments:  PROM / STM / Oscillations-Mobs:  G-I, II, III, IV (PA's, Inf., Post.)  [] (72446) Provided manual therapy to mobilize soft tissue/joints of cervical/CT, scapular GHJ and UE for the purpose of decreasing headache, modulating pain, promoting relaxation,  increasing ROM, reducing/eliminating soft tissue swelling/inflammation/restriction, improving soft tissue extensibility and allowing for proper ROM for normal function with self care, reaching, carrying, lifting, house/yardwork, driving/computer work      Charges:  Timed Code Treatment Minutes: 40   Total Treatment Minutes: 40       [] EVAL - LOW (95622)   [] EVAL - MOD (01295)  [] EVAL - HIGH (93899)  [] RE-EVAL (07265)  [x] JON(31442) x  1      [x] NMR (99995) x 1      [] Ionto  [x] Manual (01231) x 1     [] Ultrasound  [] TA x       [] Mech Traction (37686)  [] Home Management Training x   [] ES (un) (40254):           [] Aquatic    [] ES(attended) (05996)   [] Group    [] Other:    GOALS:  Patient stated goal: To improve cervical AROM   []? Progressing: []? Met: []? Not Met: []? Adjusted     Therapist goals for Patient:   Short Term Goals: To be achieved in: 2 weeks  1. Independent in HEP and progression per patient tolerance, in order to prevent re-injury. []? Progressing: []? Met: []? Not Met: []? Adjusted  2. Patient will have a decrease in pain to facilitate improvement in movement, function, and ADLs as indicated by Functional Deficits. []? Progressing: []? Met: []? Not Met: []? Adjusted     Long Term Goals: To be achieved in: 5 weeks  1. Disability index score of 10% or less for the NDI to assist with reaching prior level of function. []? Progressing: []? Met: []? Not Met: []? Adjusted  2. Patient will demonstrate increased AROM to cervical in left SB, rotation, extensionWFL of cervical/thoracic spine to allow for proper joint functioning as indicated by patients Functional Deficits. []? Progressing: []? Met: []? Not Met: []? Adjusted  3. Patient will demonstrate an increase in postural awareness and control and activation of  Deep cervical stabilizers to allow for proper functional mobility as indicated by patients Functional Deficits. []? Progressing: []? Met: []? Not Met: []? Adjusted  4. Patient will return to functional activities including  without increased symptoms or restriction. []? Progressing: []? Met: []?  Not Met: []? Adjusted  5. Patient to be able to improve with left rotation /reduce pain with driving.    []? Progressing: []? Met: []? Not Met: []? Adjusted            Overall Progression Towards Functional goals/ Treatment Progress Update:  [] Patient is progressing as expected towards functional goals listed. [] Progression is slowed due to complexities/Impairments listed. [] Progression has been slowed due to co-morbidities. [x] Plan just implemented, too soon to assess goals progression <30days   [] Goals require adjustment due to lack of progress  [] Patient is not progressing as expected and requires additional follow up with physician  [] Other    Persisting Functional Limitations/Impairments:  []Sitting []Standing   []Walking []Squatting/bending    []Stairs []ADL's    []Transfers []Reaching  []Housework []Lifting  [x]Driving []Job related tasks  []Sports/Recreation  []Sleeping  [x]Other: looking left     ASSESSMENT: Pt presents with slight improvement in AROM L rotation this date. She tolerates progressions well, but requires max cueing to maintain neutral spine posture and scap retraction with prone over swiss ball exercises. She is more successful with prone scap retraction. She continues to present with an active trigger point at the L levator scap. Continue to progress exercises as tolerated, continue with manual interventions. Treatment/Activity Tolerance:  [] Patient able to complete tx  [] Patient limited by fatique  [] Patient limited by pain  [] Patient limited by other medical complications  [] Other:     Prognosis: [] Good [] Fair  [] Poor    Patient Requires Follow-up: [x] Yes  [] No    PLAN: See eval. PT 2x / week for 5 weeks.    [] Continue per plan of care [] Alter current plan (see comments)  [x] Plan of care initiated [] Hold pending MD visit [] Discharge    Electronically signed by: Sony Carmichael, PT , DPT      Note: If patient does not return for scheduled/ recommended follow up visits, this note will serve as a discharge from care along with most recent update on progress.

## 2021-09-10 ENCOUNTER — HOSPITAL ENCOUNTER (OUTPATIENT)
Dept: PHYSICAL THERAPY | Age: 66
Setting detail: THERAPIES SERIES
Discharge: HOME | End: 2021-09-10
Payer: MEDICARE

## 2021-09-10 PROCEDURE — 97140 MANUAL THERAPY 1/> REGIONS: CPT

## 2021-09-10 PROCEDURE — 97112 NEUROMUSCULAR REEDUCATION: CPT

## 2021-09-10 PROCEDURE — 97110 THERAPEUTIC EXERCISES: CPT

## 2021-09-10 NOTE — FLOWSHEET NOTE
[address] [facility]  Phone: [phone]   Fax: [phone]    Physical Therapy Treatment Note/ Progress Report:     Date:  9/10/2021    Patient Name:  Kenrick Saha    :  1955  MRN: [id number]  Restrictions/Precautions:    Medical/Treatment Diagnosis Information:  · Diagnosis: Spondylosis of cervical region without myelopathy or radiculopathy  · Treatment Diagnosis: Decrease cervical AROM, cervical joint restriction, left shoulder weakness  Insurance/Certification information:  PT Insurance Information: [employer]  Physician Information:  Referring Practitioner: Rowdy Will MD  Plan of care signed (Y/N): []  Yes [x]  No     Date of Patient follow up with Physician:      Progress Report: []  Yes  [x]  No     Date Range for reporting period:  Beginnin21  Ending:     Progress report due (10 Rx/or 30 days whichever is less): visit #62     Recertification due (POC duration/ or 90 days whichever is less): visit #     Visit # Insurance Allowable Auth required? Date Range    Mn []  Yes  [x]  No        Latex Allergy:  [x]NO      []YES  Preferred Language for Healthcare:   [x]English       []other:     Functional Scale:         Date assessed:  NDI: raw score = 8; dysfunction =16 %                                  2021       Pain level:  2/10     SUBJECTIVE:  Patient reports she feels like she has more movement. She is feeling better than she has in a long time.      OBJECTIVE: See eval   Observation:    Test measurements:    CERV ROM Left  Right    Cervical Flexion         65 pain     Cervical Extension 45     Cervical SB 14 pain(mild) 34 pain (mild-L)   Cervical rotation 53 pain 77          RESTRICTIONS/PRECAUTIONS:     Exercises/Interventions:   Therapeutic Exercise (51306) Resistance / level Sets/sec Reps Notes   UBE  2/2 fwd/bwd     Scap retraction  2 10    Mid Row orange 2 10 9/1   High Row orange 2 10 9/1   LPD orange 2 10 9/1   3 finger cerv rotation 8/27 added   CT supine   10    Levator scap stretch  15\" 2 9/1                        Therapeutic Activities (63909)                                                 NMR re-education (17161)       Prone over swiss ball:  Shld Ext  Row  Horiz Abd    2  2   10  10 9/8   Prone retraction  2 10 9/8                        Manual Intervention (44973)       Cerv mobs/manip       Thoracic P-A mobs, thoracic distraction  X 6 min     , Direct extension mob sup, Mid-cervical locking supine, cervical manual traction  X 6 min     Rib mobilization- assess left 1st rib NPV       STM UT, cervical paraspinals, suboccipitals  X     Seated CT manip  X 2 min         Modalities: 9/1 MHP applied to c-spine with pt in supine x 10 min    Patient education:  -pt educated on diagnosis, prognosis and expectations for rehab  -all pt questions were answered    Home Exercise Program:   Access Code: [email or web address]  URL: [email or web address]  Date: 08/23/2021  Prepared by: Jeremie Bradley    Exercises  Seated Scapular Retraction - 1 x daily - 7 x weekly - 3 sets - 10 reps - 2 hold  Seated Passive Cervical Retraction - 1 x daily - 7 x weekly - 3 sets - 10 reps - 2 hold    Updated 9/1  Access Code: [email or web address]  URL: [email or web address]  Date: 09/01/2021  Prepared by: Drew Sanchez    Exercises  Standing Shoulder Row with Anchored Resistance - 1 x daily - 2 x weekly - 2 sets - 10 reps  Standing High Row with Resistance - 1 x daily - 2 x weekly - 2 sets - 10 reps  Shoulder extension with resistance - Neutral - 1 x daily - 2 x weekly - 2 sets - 10 reps        Therapeutic Exercise and NMR EXR  [x] (82059) Provided verbal/tactile cueing for activities related to strengthening, flexibility, endurance, ROM  for improvements in cervical, postural, scapular, scapulothoracic and UE control with self care, reaching, carrying, lifting, house/yardwork, driving/computer work.    [] (19771) Provided verbal/tactile cueing for activities related to improving balance, coordination, kinesthetic sense, posture, motor skill, proprioception  to assist with cervical, scapular, scapulothoracic and UE control with self care, reaching, carrying, lifting, house/yardwork, driving/computer work.  [] (47105) Therapist is in constant attendance of 2 or more patients providing skilled therapy interventions, but not providing any significant amount of measurable one-on-one time to either patient, for improvements in cervical, scapular, scapulothoracic and UE control with self care, reaching, carrying, lifting, house/yardwork, driving, computer work. Therapeutic Activities:    [] (30737 or 05014) Provided verbal/tactile cueing for activities related to improving balance, coordination, kinesthetic sense, posture, motor skill, proprioception and motor activation to allow for proper function of cervical, scapular, scapulothoracic and UE control with self care, carrying, lifting, driving/computer work.      Home Exercise Program:    [x] (99070) Reviewed/Progressed HEP activities related to strengthening, flexibility, endurance, ROM of cervical, scapular, scapulothoracic and UE control with self care, reaching, carrying, lifting, house/yardwork, driving/computer work  [] (11133) Reviewed/Progressed HEP activities related to improving balance, coordination, kinesthetic sense, posture, motor skill, proprioception of cervical, scapular, scapulothoracic and UE control with self care, reaching, carrying, lifting, house/yardwork, driving/computer work      Manual Treatments:  PROM / STM / Oscillations-Mobs:  G-I, II, III, IV (PA's, Inf., Post.)  [] (39600) Provided manual therapy to mobilize soft tissue/joints of cervical/CT, scapular GHJ and UE for the purpose of decreasing headache, modulating pain, promoting relaxation,  increasing ROM, reducing/eliminating soft tissue swelling/inflammation/restriction, improving soft tissue extensibility and allowing for proper ROM for normal function with self care, reaching, carrying, lifting, house/yardwork, driving/computer work      Charges:  Timed Code Treatment Minutes: 41   Total Treatment Minutes: 41       [] EVAL - LOW (60209)   [] EVAL - MOD (44079)  [] EVAL - HIGH (28064)  [] RE-EVAL (46040)  [x] GV(38053) x  1      [x] NMR (15377) x 1      [] Ionto  [x] Manual (54820) x 1     [] Ultrasound  [] TA x       [] Mech Traction (63960)  [] Home Management Training x   [] ES (un) (15001):           [] Aquatic    [] ES(attended) (08662)   [] Group    [] Other:    GOALS:  Patient stated goal: To improve cervical AROM   []? Progressing: []? Met: []? Not Met: []? Adjusted     Therapist goals for Patient:   Short Term Goals: To be achieved in: 2 weeks  1. Independent in HEP and progression per patient tolerance, in order to prevent re-injury. []? Progressing: []? Met: []? Not Met: []? Adjusted  2. Patient will have a decrease in pain to facilitate improvement in movement, function, and ADLs as indicated by Functional Deficits. []? Progressing: []? Met: []? Not Met: []? Adjusted     Long Term Goals: To be achieved in: 5 weeks  1. Disability index score of 10% or less for the NDI to assist with reaching prior level of function. []? Progressing: []? Met: []? Not Met: []? Adjusted  2. Patient will demonstrate increased AROM to cervical in left SB, rotation, extensionWFL of cervical/thoracic spine to allow for proper joint functioning as indicated by patients Functional Deficits. []? Progressing: []? Met: []? Not Met: []? Adjusted  3. Patient will demonstrate an increase in postural awareness and control and activation of  Deep cervical stabilizers to allow for proper functional mobility as indicated by patients Functional Deficits. []? Progressing: []? Met: []? Not Met: []? Adjusted  4. Patient will return to functional activities including  without increased symptoms or restriction. []? Progressing: []? Met: []? Not Met: []? Adjusted  5.  Patient to be able to improve with left rotation /reduce pain with driving.    []? Progressing: []? Met: []? Not Met: []? Adjusted            Overall Progression Towards Functional goals/ Treatment Progress Update:  [] Patient is progressing as expected towards functional goals listed. [] Progression is slowed due to complexities/Impairments listed. [] Progression has been slowed due to co-morbidities. [x] Plan just implemented, too soon to assess goals progression <30days   [] Goals require adjustment due to lack of progress  [] Patient is not progressing as expected and requires additional follow up with physician  [] Other    Persisting Functional Limitations/Impairments:  []Sitting []Standing   []Walking []Squatting/bending    []Stairs []ADL's    []Transfers []Reaching  []Housework []Lifting  [x]Driving []Job related tasks  []Sports/Recreation  []Sleeping  [x]Other: looking left     ASSESSMENT: Pt reports decreased pain and improved L Rot AROM this date to 53 deg. She tolerates progressions well, but requires max cueing to maintain neutral spine posture and scap retraction with prone over swiss ball exercises. She is more successful with prone scap retraction this date than last visit. She continues to present with an active trigger point at the L levator scap. Continue to progress exercises as tolerated, continue with manual interventions. Treatment/Activity Tolerance:  [] Patient able to complete tx  [] Patient limited by fatique  [] Patient limited by pain  [] Patient limited by other medical complications  [] Other:     Prognosis: [] Good [] Fair  [] Poor    Patient Requires Follow-up: [x] Yes  [] No    PLAN: See jadon PT 2x / week for 5 weeks.    [] Continue per plan of care [] Alter current plan (see comments)  [x] Plan of care initiated [] Hold pending MD visit [] Discharge    Electronically signed by: Ed Gil, PT , DPT      Note: If patient does not return for scheduled/ recommended follow up visits, this note will serve as a discharge from care along with most recent update on progress.

## 2021-09-20 ENCOUNTER — HOSPITAL ENCOUNTER (OUTPATIENT)
Dept: PHYSICAL THERAPY | Age: 66
Setting detail: THERAPIES SERIES
Discharge: HOME | End: 2021-09-20
Payer: MEDICARE

## 2021-09-20 PROCEDURE — 97112 NEUROMUSCULAR REEDUCATION: CPT

## 2021-09-20 PROCEDURE — 97140 MANUAL THERAPY 1/> REGIONS: CPT

## 2021-09-20 PROCEDURE — 97110 THERAPEUTIC EXERCISES: CPT

## 2021-09-20 NOTE — FLOWSHEET NOTE
Scap retraction  2 10    Mid Row orange 2 10 9/1 lime band npv   High Row orange 2 10 9/1   LPD orange 2 10 9/1   3 finger cerv rotation     8/27 added   CT supine   10    Levator scap stretch  15\" 2 9/1 to the right                         Therapeutic Activities (99141)                                                 NMR re-education (43298)       Prone over swiss ball:  Shld Ext  Row  Horiz Abd    2  2   10  10 9/8  Green ball  Lean over table   Prone retraction  2 10 9/8                        Manual Intervention (15728)       Cerv mobs/manip       Thoracic P-A mobs, thoracic distraction  X 6 min     , Direct extension mob sup, Mid-cervical locking supine, cervical manual traction  X 6 min     Rib mobilization- assess left 1st rib NPV       STM UT, cervical paraspinals, suboccipitals  X     Seated CT manip  X 2 min         Modalities: 9/1 MHP applied to c-spine with pt in supine x 10 min    Patient education:  -pt educated on diagnosis, prognosis and expectations for rehab  -all pt questions were answered    Home Exercise Program:   Access Code: [email or web address]  URL: [employer]/  Date: 08/23/2021  Prepared by: Julián Noriegabs    Exercises  Seated Scapular Retraction - 1 x daily - 7 x weekly - 3 sets - 10 reps - 2 hold  Seated Passive Cervical Retraction - 1 x daily - 7 x weekly - 3 sets - 10 reps - 2 hold    Updated 9/1  Access Code: [email or web address]  URL: [employer]/  Date: 09/01/2021  Prepared by: Sahil Ache    Exercises  Standing Shoulder Row with Anchored Resistance - 1 x daily - 2 x weekly - 2 sets - 10 reps  Standing High Row with Resistance - 1 x daily - 2 x weekly - 2 sets - 10 reps  Shoulder extension with resistance - Neutral - 1 x daily - 2 x weekly - 2 sets - 10 reps        Therapeutic Exercise and NMR EXR  [x] (10575) Provided verbal/tactile cueing for activities related to strengthening, flexibility, endurance, ROM  for improvements in cervical, postural, scapular, scapulothoracic and UE control with self care, reaching, carrying, lifting, house/yardwork, driving/computer work.    [] (28666) Provided verbal/tactile cueing for activities related to improving balance, coordination, kinesthetic sense, posture, motor skill, proprioception  to assist with cervical, scapular, scapulothoracic and UE control with self care, reaching, carrying, lifting, house/yardwork, driving/computer work.  [] (83537) Therapist is in constant attendance of 2 or more patients providing skilled therapy interventions, but not providing any significant amount of measurable one-on-one time to either patient, for improvements in cervical, scapular, scapulothoracic and UE control with self care, reaching, carrying, lifting, house/yardwork, driving, computer work. Therapeutic Activities:    [] (78587 or 99310) Provided verbal/tactile cueing for activities related to improving balance, coordination, kinesthetic sense, posture, motor skill, proprioception and motor activation to allow for proper function of cervical, scapular, scapulothoracic and UE control with self care, carrying, lifting, driving/computer work.      Home Exercise Program:    [x] (68187) Reviewed/Progressed HEP activities related to strengthening, flexibility, endurance, ROM of cervical, scapular, scapulothoracic and UE control with self care, reaching, carrying, lifting, house/yardwork, driving/computer work  [] (46979) Reviewed/Progressed HEP activities related to improving balance, coordination, kinesthetic sense, posture, motor skill, proprioception of cervical, scapular, scapulothoracic and UE control with self care, reaching, carrying, lifting, house/yardwork, driving/computer work      Manual Treatments:  PROM / STM / Oscillations-Mobs:  G-I, II, III, IV (PA's, Inf., Post.)  [] (96594) Provided manual therapy to mobilize soft tissue/joints of cervical/CT, scapular GHJ and UE for the purpose of decreasing headache, modulating pain, promoting relaxation,  increasing ROM, reducing/eliminating soft tissue swelling/inflammation/restriction, improving soft tissue extensibility and allowing for proper ROM for normal function with self care, reaching, carrying, lifting, house/yardwork, driving/computer work      Charges:  Timed Code Treatment Minutes: 41   Total Treatment Minutes: 41       [] EVAL - LOW (34631)   [] EVAL - MOD (67834)  [] EVAL - HIGH (57740)  [] RE-EVAL (40269)  [x] EW(29276) x  1      [x] NMR (72688) x 1      [] Ionto  [x] Manual (37101) x 1     [] Ultrasound  [] TA x       [] Mech Traction (81751)  [] Home Management Training x   [] ES (un) (09863):           [] Aquatic    [] ES(attended) (87958)   [] Group    [] Other:    GOALS:  Patient stated goal: To improve cervical AROM   []? Progressing: []? Met: []? Not Met: []? Adjusted     Therapist goals for Patient:   Short Term Goals: To be achieved in: 2 weeks  1. Independent in HEP and progression per patient tolerance, in order to prevent re-injury. []? Progressing: []? Met: []? Not Met: []? Adjusted  2. Patient will have a decrease in pain to facilitate improvement in movement, function, and ADLs as indicated by Functional Deficits. []? Progressing: []? Met: []? Not Met: []? Adjusted     Long Term Goals: To be achieved in: 5 weeks  1. Disability index score of 10% or less for the NDI to assist with reaching prior level of function. []? Progressing: []? Met: []? Not Met: []? Adjusted  2. Patient will demonstrate increased AROM to cervical in left SB, rotation, extensionWFL of cervical/thoracic spine to allow for proper joint functioning as indicated by patients Functional Deficits. []? Progressing: []? Met: []? Not Met: []? Adjusted  3. Patient will demonstrate an increase in postural awareness and control and activation of  Deep cervical stabilizers to allow for proper functional mobility as indicated by patients Functional Deficits. []? Progressing: []? Met: []?  Not Met: []? Adjusted  4. Patient will return to functional activities including  without increased symptoms or restriction. []? Progressing: []? Met: []? Not Met: []? Adjusted  5. Patient to be able to improve with left rotation /reduce pain with driving.    []? Progressing: []? Met: []? Not Met: []? Adjusted            Overall Progression Towards Functional goals/ Treatment Progress Update:  [] Patient is progressing as expected towards functional goals listed. [] Progression is slowed due to complexities/Impairments listed. [] Progression has been slowed due to co-morbidities. [x] Plan just implemented, too soon to assess goals progression <30days   [] Goals require adjustment due to lack of progress  [] Patient is not progressing as expected and requires additional follow up with physician  [] Other    Persisting Functional Limitations/Impairments:  []Sitting []Standing   []Walking []Squatting/bending    []Stairs []ADL's    []Transfers []Reaching  []Housework []Lifting  [x]Driving []Job related tasks  []Sports/Recreation  []Sleeping  [x]Other: looking left     ASSESSMENT: Pt reports decreased pain and improved L Rot AROM this date to 53 deg. She tolerates progressions well, but requires max cueing to maintain neutral spine posture and scap retraction with prone over swiss ball exercises. She is more successful with prone scap retraction this date than last visit. She continues to present with an active trigger point at the L levator scap. Continue to progress exercises as tolerated, continue with manual interventions. Treatment/Activity Tolerance:  [] Patient able to complete tx  [] Patient limited by fatique  [] Patient limited by pain  [] Patient limited by other medical complications  [] Other:     Prognosis: [] Good [] Fair  [] Poor    Patient Requires Follow-up: [x] Yes  [] No    PLAN: See bud. PT 2x / week for 5 weeks.    [] Continue per plan of care [] Alter current plan (see comments)  [x] Plan of care initiated [] Hold pending MD visit [] Discharge    Electronically signed by: Jeanna Stein, PT , DPT    Therapist was present, directed the patient's care, made skilled judgement, and was responsible for assessment and treatment of the patient. Sobia Irby, OWEN    Note: If patient does not return for scheduled/ recommended follow up visits, this note will serve as a discharge from care along with most recent update on progress.

## 2021-09-28 ENCOUNTER — HOSPITAL ENCOUNTER (OUTPATIENT)
Dept: PHYSICAL THERAPY | Age: 66
Setting detail: THERAPIES SERIES
Discharge: HOME | End: 2021-09-28
Payer: MEDICARE

## 2021-09-28 PROCEDURE — 97140 MANUAL THERAPY 1/> REGIONS: CPT

## 2021-09-28 PROCEDURE — 97112 NEUROMUSCULAR REEDUCATION: CPT

## 2021-09-28 PROCEDURE — 97110 THERAPEUTIC EXERCISES: CPT

## 2021-09-28 NOTE — FLOWSHEET NOTE
[address] [facility]  Phone: [phone]   Fax: [phone]    Physical Therapy Treatment Note/ Progress Report:     Date:  2021    Patient Name:  Chandana Bustos    :  1955  MRN: [id number]  Restrictions/Precautions:    Medical/Treatment Diagnosis Information:  · Diagnosis: Spondylosis of cervical region without myelopathy or radiculopathy  · Treatment Diagnosis: Decrease cervical AROM, cervical joint restriction, left shoulder weakness  Insurance/Certification information:  [facility] Information[facility]  Physician Information:  Referring Practitioner: Serafin He MD  Plan of care signed (Y/N): []  Yes [x]  No     Date of Patient follow up with Physician:      Progress Report: []  Yes  [x]  No     Date Range for reporting period:  Beginnin21  Ending:     Progress report due (10 Rx/or 30 days whichever is less): visit #71     Recertification due (POC duration/ or 90 days whichever is less): visit #     Visit # Insurance Allowable Auth required? Date Range    Mn []  Yes  [x]  No        Latex Allergy:  [x]NO      []YES  Preferred Language for Healthcare:   [x]English       []other:     Functional Scale:         Date assessed:  NDI: raw score = 8; dysfunction =16 %                                  2021       Pain level:  2-3/10     SUBJECTIVE:  Patient reports she gets sore after therapy. She does feel like she is making progress, but still has pain at the L UT region. She will be travelling to [facility] and will be out for a week. She would like to start transitioning to .      OBJECTIVE: See eval   Observation:    Test measurements:    CERV ROM Left  Right    Cervical Flexion         65 pain     Cervical Extension 45     Cervical SB 14 pain(mild) vs 45 34 pain (mild-L)   Cervical rotation 68 pain 77          RESTRICTIONS/PRECAUTIONS:     Exercises/Interventions:   Therapeutic Exercise (39006) Resistance / level Sets/sec Reps Notes   UBE  2/2 fwd/bwd Scap retraction  2 10    Mid Row 9/1    High Row 9/1   LPD 9/1   3 finger cerv rotation     8/27 added   CT supine   10    Levator scap stretch  15\" 2 9/1 to the right    FM:  Mid Row  High Row   25#  25#   2  2   10  10 9/28                 Therapeutic Activities (40449)                                                 NMR re-education (53396)       Prone over swiss ball:  Shld Ext  Row  Horiz Abd    2  2  1   10  10  10 9/8  Green ball  Lean over table  9/28   Prone retraction  2 10 9/8                        Manual Intervention (12104)       Cerv mobs/manip       Thoracic P-A mobs, thoracic distraction  X 6 min     , Direct extension mob sup, Mid-cervical locking supine, cervical manual traction  X 6 min     Rib mobilization- assess left 1st rib NPV       STM UT, cervical paraspinals, suboccipitals  X     Seated CT manip  X 2 min         Modalities: 9/1 MHP applied to c-spine with pt in supine x 10 min    Patient education:  -pt educated on diagnosis, prognosis and expectations for rehab  -all pt questions were answered    Home Exercise Program:   Access Code: [email or web address]  URL: [employer]/  Date: 08/23/2021  Prepared by: Jeremie Bradley    Exercises  Seated Scapular Retraction - 1 x daily - 7 x weekly - 3 sets - 10 reps - 2 hold  Seated Passive Cervical Retraction - 1 x daily - 7 x weekly - 3 sets - 10 reps - 2 hold    Updated 9/1  Access Code: [email or web address]  URL: [employer]/  Date: 09/01/2021  Prepared by: Drew Sanchez    Exercises  Standing Shoulder Row with Anchored Resistance - 1 x daily - 2 x weekly - 2 sets - 10 reps  Standing High Row with Resistance - 1 x daily - 2 x weekly - 2 sets - 10 reps  Shoulder extension with resistance - Neutral - 1 x daily - 2 x weekly - 2 sets - 10 reps        Therapeutic Exercise and NMR EXR  [x] (52985) Provided verbal/tactile cueing for activities related to strengthening, flexibility, endurance, ROM  for improvements in cervical, postural, scapular, scapulothoracic and UE control with self care, reaching, carrying, lifting, house/yardwork, driving/computer work.    [] (01031) Provided verbal/tactile cueing for activities related to improving balance, coordination, kinesthetic sense, posture, motor skill, proprioception  to assist with cervical, scapular, scapulothoracic and UE control with self care, reaching, carrying, lifting, house/yardwork, driving/computer work.  [] (87602) Therapist is in constant attendance of 2 or more patients providing skilled therapy interventions, but not providing any significant amount of measurable one-on-one time to either patient, for improvements in cervical, scapular, scapulothoracic and UE control with self care, reaching, carrying, lifting, house/yardwork, driving, computer work. Therapeutic Activities:    [] (32623 or 62903) Provided verbal/tactile cueing for activities related to improving balance, coordination, kinesthetic sense, posture, motor skill, proprioception and motor activation to allow for proper function of cervical, scapular, scapulothoracic and UE control with self care, carrying, lifting, driving/computer work.      Home Exercise Program:    [x] (74495) Reviewed/Progressed HEP activities related to strengthening, flexibility, endurance, ROM of cervical, scapular, scapulothoracic and UE control with self care, reaching, carrying, lifting, house/yardwork, driving/computer work  [] (93760) Reviewed/Progressed HEP activities related to improving balance, coordination, kinesthetic sense, posture, motor skill, proprioception of cervical, scapular, scapulothoracic and UE control with self care, reaching, carrying, lifting, house/yardwork, driving/computer work      Manual Treatments:  PROM / STM / Oscillations-Mobs:  G-I, II, III, IV (PA's, Inf., Post.)  [] (66915) Provided manual therapy to mobilize soft tissue/joints of cervical/CT, scapular GHJ and UE for the purpose of decreasing headache, modulating pain, promoting relaxation,  increasing ROM, reducing/eliminating soft tissue swelling/inflammation/restriction, improving soft tissue extensibility and allowing for proper ROM for normal function with self care, reaching, carrying, lifting, house/yardwork, driving/computer work      Charges:  Timed Code Treatment Minutes: 40   Total Treatment Minutes: 40       [] EVAL - LOW (34850)   [] EVAL - MOD (07518)  [] EVAL - HIGH (09415)  [] RE-EVAL (52959)  [x] FB(81369) x  1      [x] NMR (79681) x 1      [] Ionto  [x] Manual (72664) x 1     [] Ultrasound  [] TA x       [] Mech Traction (40989)  [] Home Management Training x   [] ES (un) (18792):           [] Aquatic    [] ES(attended) (89553)   [] Group    [] Other:    GOALS:  Patient stated goal: To improve cervical AROM   []? Progressing: []? Met: []? Not Met: []? Adjusted     Therapist goals for Patient:   Short Term Goals: To be achieved in: 2 weeks  1. Independent in HEP and progression per patient tolerance, in order to prevent re-injury. []? Progressing: []? Met: []? Not Met: []? Adjusted  2. Patient will have a decrease in pain to facilitate improvement in movement, function, and ADLs as indicated by Functional Deficits. []? Progressing: []? Met: []? Not Met: []? Adjusted     Long Term Goals: To be achieved in: 5 weeks  1. Disability index score of 10% or less for the NDI to assist with reaching prior level of function. []? Progressing: []? Met: []? Not Met: []? Adjusted  2. Patient will demonstrate increased AROM to cervical in left SB, rotation, extensionWFL of cervical/thoracic spine to allow for proper joint functioning as indicated by patients Functional Deficits. []? Progressing: []? Met: []? Not Met: []? Adjusted  3. Patient will demonstrate an increase in postural awareness and control and activation of  Deep cervical stabilizers to allow for proper functional mobility as indicated by patients Functional Deficits. []?  Progressing: []? Met: []? Not Met: []? Adjusted  4. Patient will return to functional activities including  without increased symptoms or restriction. []? Progressing: []? Met: []? Not Met: []? Adjusted  5. Patient to be able to improve with left rotation /reduce pain with driving.    []? Progressing: []? Met: []? Not Met: []? Adjusted            Overall Progression Towards Functional goals/ Treatment Progress Update:  [] Patient is progressing as expected towards functional goals listed. [] Progression is slowed due to complexities/Impairments listed. [] Progression has been slowed due to co-morbidities. [x] Plan just implemented, too soon to assess goals progression <30days   [] Goals require adjustment due to lack of progress  [] Patient is not progressing as expected and requires additional follow up with physician  [] Other    Persisting Functional Limitations/Impairments:  []Sitting []Standing   []Walking []Squatting/bending    []Stairs []ADL's    []Transfers []Reaching  []Housework []Lifting  [x]Driving []Job related tasks  []Sports/Recreation  []Sleeping  [x]Other: looking left     ASSESSMENT: Pt presents with improved AROM L Rot to 68 deg this date, however continues to present with deficits in STM of the L UT. Pt is interested in pursuing TPDN. She does present with several latent trigger points in the L UT. Treating therapist this date will reach out to referring MD for approval. Pt would also like to begin transitioning to the  in anticipation of eventual DC. Continue to progress exercises as tolerated, continue with manual interventions. Treatment/Activity Tolerance:  [] Patient able to complete tx  [] Patient limited by fatique  [] Patient limited by pain  [] Patient limited by other medical complications  [] Other:     Prognosis: [] Good [] Fair  [] Poor    Patient Requires Follow-up: [x] Yes  [] No    PLAN: See eval. PT 2x / week for 5 weeks.    [] Continue per plan of care [] Alter current plan (see comments)  [x] Plan of care initiated [] Hold pending MD visit [] Discharge    Electronically signed by: Emry Ignacio, PT , DPT        Note: If patient does not return for scheduled/ recommended follow up visits, this note will serve as a discharge from care along with most recent update on progress.

## 2021-09-30 ENCOUNTER — HOSPITAL ENCOUNTER (OUTPATIENT)
Dept: PHYSICAL THERAPY | Age: 66
Setting detail: THERAPIES SERIES
Discharge: HOME | End: 2021-09-30
Payer: MEDICARE

## 2021-09-30 PROCEDURE — 97140 MANUAL THERAPY 1/> REGIONS: CPT

## 2021-09-30 PROCEDURE — 97112 NEUROMUSCULAR REEDUCATION: CPT

## 2021-09-30 PROCEDURE — 97110 THERAPEUTIC EXERCISES: CPT

## 2021-09-30 NOTE — FLOWSHEET NOTE
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 or radiculopathy  · Treatment Diagnosis: Decrease cervical AROM, cervical joint restriction, left shoulder weakness  Insurance/Certification information:  PT Insurance Information: [facility]  Physician Information:  Referring Practitioner: Godwin Anderson MD  Plan of care signed (Y/N): []  Yes [x]  No     Date of Patient follow up with Physician:      Progress Report: []  Yes  [x]  No     Date Range for reporting period:  Beginnin21  Ending:     Progress report due (10 Rx/or 30 days whichever is less): visit #95     Recertification due (POC duration/ or 90 days whichever is less): visit #     Visit # Insurance Allowable Auth required? Date Range    Mn []  Yes  [x]  No        Latex Allergy:  [x]NO      []YES  Preferred Language for Healthcare:   [x]English       []other:     Functional Scale:         Date assessed:  NDI: raw score = 8; dysfunction =16 %                                  2021       Pain level:  3-4/10     SUBJECTIVE:  Pt states that she feels pretty good today, but the pain is still there. Her pain is in the UT region. Pt reports when she looks straight ahead, she doesn't feel any pain. The pain usually comes when she's looking to her left.      OBJECTIVE: See eval   Observation:    Test measurements:    CERV ROM Left  Right    Cervical Flexion         65 pain     Cervical Extension 45     Cervical SB 14 pain(mild) vs 45 40 pain (mild-L)   Cervical rotation 69 pain 77          RESTRICTIONS/PRECAUTIONS:     Exercises/Interventions:   Therapeutic Exercise (39181) Resistance / level Sets/sec Reps Notes   UBE  2/2 fwd/bwd Scap retraction  2 10    Mid Row 9/1    High Row 9/1   LPD 9/1   3 finger cerv rotation     8/27 added   CT supine   10    CT w/ rot R/L  supine   10 9/30 Added   Levator scap stretch  15\" 2 9/1 to the right    FM:  Mid Row  High Row   25#  25#   2  2   10  10 9/28                 Therapeutic Activities (05824)                                                 NMR re-education (40886)       Prone over swiss ball:  Shld Ext  Row  Horiz Abd    2  2  2   10  10  10 9/8  Red ball  Lean over table  9/28   Prone retraction  2 10 9/8                        Manual Intervention (69142)       Cerv mobs/manip       Thoracic P-A mobs, thoracic distraction  X 6 min     , Direct extension mob sup, Mid-cervical locking supine, cervical manual traction  X 6 min     2nd rib mobilization       STM UT, cervical paraspinals, suboccipitals  X     Seated CT manip  X 2 min         Modalities: 9/1 MHP applied to c-spine with pt in supine x 10 min    Patient education:  -pt educated on diagnosis, prognosis and expectations for rehab  -all pt questions were answered    Home Exercise Program:   Access Code: [email or web address]  URL: [email or web address]  Date: 08/23/2021  Prepared by: Marcellus Ahn    Exercises  Seated Scapular Retraction - 1 x daily - 7 x weekly - 3 sets - 10 reps - 2 hold  Seated Passive Cervical Retraction - 1 x daily - 7 x weekly - 3 sets - 10 reps - 2 hold    Updated 9/1  Access Code: [email or web address]  URL: [email or web address]  Date: 09/01/2021  Prepared by: Chandler Estevez    Exercises  Standing Shoulder Row with Anchored Resistance - 1 x daily - 2 x weekly - 2 sets - 10 reps  Standing High Row with Resistance - 1 x daily - 2 x weekly - 2 sets - 10 reps  Shoulder extension with resistance - Neutral - 1 x daily - 2 x weekly - 2 sets - 10 reps        Therapeutic Exercise and NMR EXR  [x] (22743) Provided verbal/tactile cueing for activities related to strengthening, flexibility, endurance, ROM  for improvements in cervical, postural, scapular, scapulothoracic and UE control with self care, reaching, carrying, lifting, house/yardwork, driving/computer work.    [] (48450) Provided verbal/tactile cueing for activities related to improving balance, coordination, kinesthetic sense, posture, motor skill, proprioception  to assist with cervical, scapular, scapulothoracic and UE control with self care, reaching, carrying, lifting, house/yardwork, driving/computer work.  [] (26906) Therapist is in constant attendance of 2 or more patients providing skilled therapy interventions, but not providing any significant amount of measurable one-on-one time to either patient, for improvements in cervical, scapular, scapulothoracic and UE control with self care, reaching, carrying, lifting, house/yardwork, driving, computer work. Therapeutic Activities:    [] (55553 or 59388) Provided verbal/tactile cueing for activities related to improving balance, coordination, kinesthetic sense, posture, motor skill, proprioception and motor activation to allow for proper function of cervical, scapular, scapulothoracic and UE control with self care, carrying, lifting, driving/computer work.      Home Exercise Program:    [x] (30845) Reviewed/Progressed HEP activities related to strengthening, flexibility, endurance, ROM of cervical, scapular, scapulothoracic and UE control with self care, reaching, carrying, lifting, house/yardwork, driving/computer work  [] (80489) Reviewed/Progressed HEP activities related to improving balance, coordination, kinesthetic sense, posture, motor skill, proprioception of cervical, scapular, scapulothoracic and UE control with self care, reaching, carrying, lifting, house/yardwork, driving/computer work      Manual Treatments:  PROM / STM / Oscillations-Mobs:  G-I, II, III, IV (PA's, Inf., Post.)  [] (83791) Provided manual therapy to mobilize soft tissue/joints of cervical/CT, scapular GHJ and UE for the purpose of decreasing headache, modulating pain, promoting relaxation,  increasing ROM, reducing/eliminating soft tissue swelling/inflammation/restriction, improving soft tissue extensibility and allowing for proper ROM for normal function with self care, reaching, carrying, lifting, house/yardwork, driving/computer work      Charges:  Timed Code Treatment Minutes: 55    Total Treatment Minutes: 55        [] EVAL - LOW (84005)   [] EVAL - MOD (18562)  [] EVAL - HIGH (53939)  [] RE-EVAL (23506)  [x] IK(60515) x  2      [x] NMR (46066) x 1      [] Ionto  [x] Manual (82405) x 1     [] Ultrasound  [] TA x       [] Mech Traction (50185)  [] Home Management Training x   [] ES (un) (75660):           [] Aquatic    [] ES(attended) (57237)   [] Group    [] Other:    GOALS:  Patient stated goal: To improve cervical AROM   []? Progressing: []? Met: []? Not Met: []? Adjusted     Therapist goals for Patient:   Short Term Goals: To be achieved in: 2 weeks  1. Independent in HEP and progression per patient tolerance, in order to prevent re-injury. []? Progressing: []? Met: []? Not Met: []? Adjusted  2. Patient will have a decrease in pain to facilitate improvement in movement, function, and ADLs as indicated by Functional Deficits. []? Progressing: []? Met: []? Not Met: []? Adjusted     Long Term Goals: To be achieved in: 5 weeks  1. Disability index score of 10% or less for the NDI to assist with reaching prior level of function. []? Progressing: []? Met: []? Not Met: []? Adjusted  2. Patient will demonstrate increased AROM to cervical in left SB, rotation, extensionWFL of cervical/thoracic spine to allow for proper joint functioning as indicated by patients Functional Deficits. []? Progressing: []? Met: []? Not Met: []? Adjusted  3. Patient will demonstrate an increase in postural awareness and control and activation of  Deep cervical stabilizers to allow for proper functional mobility as indicated by patients Functional Deficits.    []? Progressing: []? Met: []? Not Met: []? Adjusted  4. Patient will return to functional activities including  without increased symptoms or restriction. []? Progressing: []? Met: []? Not Met: []? Adjusted  5. Patient to be able to improve with left rotation /reduce pain with driving.    []? Progressing: []? Met: []? Not Met: []? Adjusted            Overall Progression Towards Functional goals/ Treatment Progress Update:  [] Patient is progressing as expected towards functional goals listed. [] Progression is slowed due to complexities/Impairments listed. [] Progression has been slowed due to co-morbidities. [x] Plan just implemented, too soon to assess goals progression <30days   [] Goals require adjustment due to lack of progress  [] Patient is not progressing as expected and requires additional follow up with physician  [] Other    Persisting Functional Limitations/Impairments:  []Sitting []Standing   []Walking []Squatting/bending    []Stairs []ADL's    []Transfers []Reaching  []Housework []Lifting  [x]Driving []Job related tasks  []Sports/Recreation  []Sleeping  [x]Other: looking left     ASSESSMENT: Pt presents with improved L cervical rot AROM to 69 deg this date and R cervical SB to 40 deg; however, continues to have pain at end range. Pt tolerated added supine chin tucks w/ rotation. Pt received 2nd rib mobs and thoracic P-A mobilizations with good response. Will continue to progress exercises as tolerated and continue with manual interventions. Pt will be out of town until next treatment session on 10/11 and will monitor symptoms when she comes back. Pt will continue to further benefit from skilled therapy to restore PLOF. Waiting on requested DN orders to be approved by physician.         Treatment/Activity Tolerance:  [] Patient able to complete tx  [] Patient limited by fatique  [] Patient limited by pain  [] Patient limited by other medical complications  [] Other:     Prognosis: [] Good [] Fair  []

## 2021-10-01 ENCOUNTER — APPOINTMENT (OUTPATIENT)
Dept: PHYSICAL THERAPY | Age: 66
End: 2021-10-01
Payer: MEDICARE

## 2021-10-11 ENCOUNTER — HOSPITAL ENCOUNTER (OUTPATIENT)
Dept: PHYSICAL THERAPY | Age: 66
Setting detail: THERAPIES SERIES
Discharge: HOME | End: 2021-10-11
Payer: MEDICARE

## 2021-10-11 PROCEDURE — 97112 NEUROMUSCULAR REEDUCATION: CPT

## 2021-10-11 PROCEDURE — 97110 THERAPEUTIC EXERCISES: CPT

## 2021-10-11 PROCEDURE — 97530 THERAPEUTIC ACTIVITIES: CPT

## 2021-10-11 NOTE — PROGRESS NOTES
in order to continue manual interventions, improve cervical ROM, and increase L shoulder strength to restore PLOF. Date range of Visits: 2021 - 10/11/2021  Total Visits: 10    Recommendation:    [x]Continue PT 1x / wk for 2 weeks. []Hold PT, pending MD visit        Physical Therapy Treatment Note/ Progress Report:     Date:  10/11/2021    Patient Name:  Tanesha Arce    :  1955  MRN: [id number]  Restrictions/Precautions:    Medical/Treatment Diagnosis Information:  · Diagnosis: Spondylosis of cervical region without myelopathy or radiculopathy  · Treatment Diagnosis: Decrease cervical AROM, cervical joint restriction, left shoulder weakness  Insurance/Certification information:  PT Insurance Information: [facility]  Physician Information:  Referring Practitioner: Lenin Renee MD  Plan of care signed (Y/N): []  Yes [x]  No     Date of Patient follow up with Physician:      Progress Report: []  Yes  [x]  No     Date Range for reporting period:  Beginnin21  Ending:     Progress report due (10 Rx/or 30 days whichever is less): visit #50     Recertification due (POC duration/ or 90 days whichever is less): visit #     Visit # Insurance Allowable Auth required? Date Range   10/12 [place] []  Yes  [x]  No        Latex Allergy:  [x]NO      []YES  Preferred Language for Healthcare:   [x]English       []other:     Functional Scale:         Date assessed:  NDI: raw score =  5; dysfunction = 10%             10/11/2021  NDI: raw score = 8; dysfunction =16 %                                  2021       Pain level:  1/10     SUBJECTIVE:  Pt states she feels pretty good today and that her pain is minimal. She feels as though physical therapy is helping a great amount. Pt states at the beginning of her vacation, she had to put patches on at night, but then it didn't give her any trouble the rest of her vacation.        OBJECTIVE: See eval   Observation:    Test measurements:    CERV ROM Left Right    Cervical Flexion         65 pain vs 45     Cervical Extension 45 vs 40     Cervical SB 14 pain(mild) vs 45 40 pain (mild-L) vs 45 (mild pain - L)   Cervical rotation 69 (pain) vs 74 (mild pain - L) 77      Strength / Myotomes Left Right   Cervical Flexion (C1-2) 4 5   Cervical Side-bending (C3) 4 vs 5 5   Shoulder Shrug (C4) 5 5   Shoulder Flex 4 5   Shoulder Scap 4  5   Shoulder Abduction (C5) 4 (pain) 5   Shoulder ER 4 vs 4+ 5   Shoulder IR 4 vs 4+ 5          RESTRICTIONS/PRECAUTIONS:     Exercises/Interventions:   Therapeutic Exercise (51541) Resistance / level Sets/sec Reps Notes   UBE  2/2 fwd/bwd     Scap retraction  2 10    Mid Row 9/1    High Row 9/1   LPD 9/1   3 finger cerv rotation     8/27 added   CT supine   10    CT w/ rot R/L  supine   10 9/30 Added   Levator scap stretch  15\" 2 9/1 to the right    FM:  Mid Row  High Row   25#  25#   2  2   10  10 9/28   Cat camel  1 10 10/11 Added          Therapeutic Activities (82663)       Re-assessment 10 min   10/11   NDI  3 min   10/11                               NMR re-education (60617)       Prone over swiss ball:  Shld Ext  Row  Horiz Abd    2  2  2   10  10  10 9/8  Red ball  Lean over table  9/28   Prone retraction  2 10 9/8                        Manual Intervention (24994)       Cerv mobs/manip       Thoracic P-A mobs, thoracic distraction      , Direct extension mob sup, Mid-cervical locking supine, cervical manual traction      2nd rib mobilization       STM UT, cervical paraspinals, suboccipitals  X     Seated CT manip           Modalities: 9/1 MHP applied to c-spine with pt in supine x 10 min    Patient education:  -pt educated on diagnosis, prognosis and expectations for rehab  -all pt questions were answered    Home Exercise Program:   Access Code: [email or web address]  URL: [email or web address]  Date: 08/23/2021  Prepared by: Santiago Swan    Exercises  Seated Scapular Retraction - 1 x daily - 7 x weekly - 3 sets - 10 reps - 2 hold  Seated Passive Cervical Retraction - 1 x daily - 7 x weekly - 3 sets - 10 reps - 2 hold    Updated 9/1  Access Code: [email or web address]  URL: [email or web address]  Date: 09/01/2021  Prepared by: Krysten Collinsing    Exercises  Standing Shoulder Row with Anchored Resistance - 1 x daily - 2 x weekly - 2 sets - 10 reps  Standing High Row with Resistance - 1 x daily - 2 x weekly - 2 sets - 10 reps  Shoulder extension with resistance - Neutral - 1 x daily - 2 x weekly - 2 sets - 10 reps        Therapeutic Exercise and NMR EXR  [x] (59843) Provided verbal/tactile cueing for activities related to strengthening, flexibility, endurance, ROM  for improvements in cervical, postural, scapular, scapulothoracic and UE control with self care, reaching, carrying, lifting, house/yardwork, driving/computer work.    [] (83992) Provided verbal/tactile cueing for activities related to improving balance, coordination, kinesthetic sense, posture, motor skill, proprioception  to assist with cervical, scapular, scapulothoracic and UE control with self care, reaching, carrying, lifting, house/yardwork, driving/computer work.  [] (36518) Therapist is in constant attendance of 2 or more patients providing skilled therapy interventions, but not providing any significant amount of measurable one-on-one time to either patient, for improvements in cervical, scapular, scapulothoracic and UE control with self care, reaching, carrying, lifting, house/yardwork, driving, computer work. Therapeutic Activities:    [] (04042 or 89396) Provided verbal/tactile cueing for activities related to improving balance, coordination, kinesthetic sense, posture, motor skill, proprioception and motor activation to allow for proper function of cervical, scapular, scapulothoracic and UE control with self care, carrying, lifting, driving/computer work.      Home Exercise Program:    [x] (59421) Reviewed/Progressed HEP activities related to strengthening, flexibility, endurance, ROM of cervical, scapular, scapulothoracic and UE control with self care, reaching, carrying, lifting, house/yardwork, driving/computer work  [] (50927) Reviewed/Progressed HEP activities related to improving balance, coordination, kinesthetic sense, posture, motor skill, proprioception of cervical, scapular, scapulothoracic and UE control with self care, reaching, carrying, lifting, house/yardwork, driving/computer work      Manual Treatments:  PROM / STM / Oscillations-Mobs:  G-I, II, III, IV (PA's, Inf., Post.)  [] (91150) Provided manual therapy to mobilize soft tissue/joints of cervical/CT, scapular GHJ and UE for the purpose of decreasing headache, modulating pain, promoting relaxation,  increasing ROM, reducing/eliminating soft tissue swelling/inflammation/restriction, improving soft tissue extensibility and allowing for proper ROM for normal function with self care, reaching, carrying, lifting, house/yardwork, driving/computer work      Charges:  Timed Code Treatment Minutes: 45    Total Treatment Minutes: 45        [] EVAL - LOW (70911)   [] EVAL - MOD (74711)  [] EVAL - HIGH (26988)  [] RE-EVAL (87586)  [x] GW(33539) x  1      [x] NMR (95736) x 1      [] Ionto  [] Manual (50939) x      [] Ultrasound  [x] TA x 1      [] Mech Traction (94600)  [] Home Management Training x   [] ES (un) (32866):           [] Aquatic    [] ES(attended) (97602)   [] Group    [] Other:    GOALS:  Patient stated goal: To improve cervical AROM   [x]? Progressing: []? Met: []? Not Met: []? Adjusted     Therapist goals for Patient:   Short Term Goals: To be achieved in: 2 weeks  1. Independent in HEP and progression per patient tolerance, in order to prevent re-injury. []? Progressing: [x]? Met: []? Not Met: []? Adjusted  2. Patient will have a decrease in pain to facilitate improvement in movement, function, and ADLs as indicated by Functional Deficits. [x]? Progressing: []? Met: []? Not Met: []?  Adjusted     Long Term Goals: To be achieved in: 5 weeks  1. Disability index score of 10% or less for the NDI to assist with reaching prior level of function. []? Progressing: [x]? Met: []? Not Met: []? Adjusted  2. Patient will demonstrate increased AROM to cervical in left SB, rotation, extensionWFL of cervical/thoracic spine to allow for proper joint functioning as indicated by patients Functional Deficits. [x]? Progressing: []? Met: []? Not Met: []? Adjusted  3. Patient will demonstrate an increase in postural awareness and control and activation of  Deep cervical stabilizers to allow for proper functional mobility as indicated by patients Functional Deficits. []? Progressing: [x]? Met: []? Not Met: []? Adjusted  4. Patient will return to functional activities including  without increased symptoms or restriction. [x]? Progressing: []? Met: []? Not Met: []? Adjusted  5. Patient to be able to improve with left rotation /reduce pain with driving. [x]? Progressing: []? Met: []? Not Met: []? Adjusted            Overall Progression Towards Functional goals/ Treatment Progress Update:  [] Patient is progressing as expected towards functional goals listed. [] Progression is slowed due to complexities/Impairments listed. [] Progression has been slowed due to co-morbidities.   [x] Plan just implemented, too soon to assess goals progression <30days   [] Goals require adjustment due to lack of progress  [] Patient is not progressing as expected and requires additional follow up with physician  [] Other    Persisting Functional Limitations/Impairments:  []Sitting []Standing   []Walking []Squatting/bending    []Stairs []ADL's    []Transfers []Reaching  []Housework []Lifting  [x]Driving []Job related tasks  []Sports/Recreation  []Sleeping  [x]Other: looking left     ASSESSMENT: See above       Treatment/Activity Tolerance:  [x] Patient able to complete tx  [] Patient limited by fatique  [] Patient limited by pain  [] Patient limited by other medical complications  [] Other:     Prognosis: [] Good [] Fair  [] Poor    Patient Requires Follow-up: [x] Yes  [] No    PLAN: See eval. PT 2x / week for 5 weeks. [] Continue per plan of care [] Alter current plan (see comments)  [x] Plan of care initiated [] Hold pending MD visit [] Discharge    Electronically signed by: Juan Powers, PT , DPT, OMT-C    Therapist was present, directed the patient's care, made skilled judgement, and was responsible for assessment and treatment of the patient. Azam Goodman, SPT    Note: If patient does not return for scheduled/ recommended follow up visits, this note will serve as a discharge from care along with most recent update on progress.

## 2021-10-13 ENCOUNTER — HOSPITAL ENCOUNTER (OUTPATIENT)
Dept: PHYSICAL THERAPY | Age: 66
Setting detail: THERAPIES SERIES
Discharge: HOME | End: 2021-10-13
Payer: MEDICARE

## 2021-10-13 PROCEDURE — 97110 THERAPEUTIC EXERCISES: CPT

## 2021-10-13 PROCEDURE — 97140 MANUAL THERAPY 1/> REGIONS: CPT

## 2021-10-13 PROCEDURE — 20560 NDL INSJ W/O NJX 1 OR 2 MUSC: CPT

## 2021-10-13 NOTE — PROGRESS NOTES
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 or radiculopathy  · Treatment Diagnosis: Decrease cervical AROM, cervical joint restriction, left shoulder weakness  Insurance/Certification information:  PT Insurance Information: [facility] Medicare  Physician Information:  Referring Practitioner: Brenda Myers MD  Plan of care signed (Y/N): []  Yes [x]  No     Date of Patient follow up with Physician:      Progress Report: []  Yes  [x]  No     Date Range for reporting period:  Beginnin21  Ending:     Progress report due (10 Rx/or 30 days whichever is less): visit #95     Recertification due (POC duration/ or 90 days whichever is less): visit #     Visit # Insurance Allowable Auth required?  Date Range   10/12 [place] []  Yes  [x]  No        Latex Allergy:  [x]NO      []YES  Preferred Language for Healthcare:   [x]English       []other:     Functional Scale:         Date assessed:  NDI: raw score =  5; dysfunction = 10%             10/11/2021  NDI: raw score = 8; dysfunction =16 %                                  2021       Pain level:  1/10     SUBJECTIVE:  Pt reports she is feeling tighter today than usual.     OBJECTIVE: See eval   Observation:    Test measurements:    CERV ROM Left  Right    Cervical Flexion         65 pain vs 45     Cervical Extension 45 vs 40     Cervical SB 14 pain(mild) vs 45 40 pain (mild-L) vs 45 (mild pain - L)   Cervical rotation 69 (pain) vs 74 (mild pain - L) 77      Strength / Myotomes Left Right   Cervical Flexion (C1-2) 4 5   Cervical Side-bending (C3) 4 vs 5 5   Shoulder Shrug (C4) 5 5   Shoulder Flex 4 5   Shoulder Scap 4  5   Shoulder Abduction (C5) 4 (pain) 5 Shoulder ER 4 vs 4+ 5   Shoulder IR 4 vs 4+ 5          RESTRICTIONS/PRECAUTIONS:     Exercises/Interventions:   Therapeutic Exercise (32064) Resistance / level Sets/sec Reps Notes   UBE  2/2 fwd/bwd     Scap retraction  2 10    Mid Row 9/1    High Row 9/1   LPD 9/1   3 finger cerv rotation   10  8/27 added   CT supine   10    CT w/ rot R/L  supine   10 9/30 Added   Levator scap stretch  15\" 2 9/1 to the right    FM:  Mid Row  High Row   25#  25#   2  2   10  10 9/28   Cat camel  10/11 Added          Therapeutic Activities (92192)       Re-assessment   10/11   NDI    10/11                               NMR re-education (74445)       Prone over swiss ball:  Shld Ext  Row  Horiz Abd    2  2  2   10  10  10 9/8  Red ball  Lean over table  9/28   Prone retraction  2 10 9/8                        Manual Intervention (46282)       Cerv mobs/manip       Thoracic P-A mobs, thoracic distraction      , Direct extension mob sup, Mid-cervical locking supine, cervical manual traction      2nd rib mobilization       Cervical distraction and OA PROM flexion  X 4 min     STM UT, cervical paraspinals, suboccipitals  X 5 min     Seated CT manip  X 2 min       Spoke with Dr. Daniel Florian  regarding the use of Dry Needling     Dry needling manual therapy: consisted on the placement of 2 needles in the following muscles:  L UT. A 40mm mm needle was inserted, piston, rotated, and coned to produce intramuscular mobilization. These techniques were used to restore functional range of motion, reduce muscle spasm and induce healing in the corresponding musculature. (15267)  Clean Technique was utilized today while applying Dry needling treatment. The treatment sites where cleaned with 70% solution of  isopropyl alcohol . The PT washed their hands and utilized treatment gloves along with hand  prior to inserting the needles. All needles where removed and discarded in the appropriate sharps container.   MD has given verbal and/or written approval for this treatment. Modalities: 10/13, 9/1 MHP applied to c-spine with pt in supine x 10 min    Patient education:  -pt educated on diagnosis, prognosis and expectations for rehab  -all pt questions were answered    Home Exercise Program:   Access Code: [email or web address]  URL: [employer]/  Date: 08/23/2021  Prepared by: Johanna Berrios    Exercises  Seated Scapular Retraction - 1 x daily - 7 x weekly - 3 sets - 10 reps - 2 hold  Seated Passive Cervical Retraction - 1 x daily - 7 x weekly - 3 sets - 10 reps - 2 hold    Updated 9/1  Access Code: [email or web address]  URL: [employer]/  Date: 09/01/2021  Prepared by: Jeffery Altman    Exercises  Standing Shoulder Row with Anchored Resistance - 1 x daily - 2 x weekly - 2 sets - 10 reps  Standing High Row with Resistance - 1 x daily - 2 x weekly - 2 sets - 10 reps  Shoulder extension with resistance - Neutral - 1 x daily - 2 x weekly - 2 sets - 10 reps        Therapeutic Exercise and NMR EXR  [x] (08836) Provided verbal/tactile cueing for activities related to strengthening, flexibility, endurance, ROM  for improvements in cervical, postural, scapular, scapulothoracic and UE control with self care, reaching, carrying, lifting, house/yardwork, driving/computer work.    [] (43956) Provided verbal/tactile cueing for activities related to improving balance, coordination, kinesthetic sense, posture, motor skill, proprioception  to assist with cervical, scapular, scapulothoracic and UE control with self care, reaching, carrying, lifting, house/yardwork, driving/computer work.  [] (38284) Therapist is in constant attendance of 2 or more patients providing skilled therapy interventions, but not providing any significant amount of measurable one-on-one time to either patient, for improvements in cervical, scapular, scapulothoracic and UE control with self care, reaching, carrying, lifting, house/yardwork, driving, computer work.      Therapeutic Activities:    [] (82448 or 18111) Provided verbal/tactile cueing for activities related to improving balance, coordination, kinesthetic sense, posture, motor skill, proprioception and motor activation to allow for proper function of cervical, scapular, scapulothoracic and UE control with self care, carrying, lifting, driving/computer work. Home Exercise Program:    [x] (02827) Reviewed/Progressed HEP activities related to strengthening, flexibility, endurance, ROM of cervical, scapular, scapulothoracic and UE control with self care, reaching, carrying, lifting, house/yardwork, driving/computer work  [] (36728) Reviewed/Progressed HEP activities related to improving balance, coordination, kinesthetic sense, posture, motor skill, proprioception of cervical, scapular, scapulothoracic and UE control with self care, reaching, carrying, lifting, house/yardwork, driving/computer work      Manual Treatments:  PROM / STM / Oscillations-Mobs:  G-I, II, III, IV (PA's, Inf., Post.)  [] (76436) Provided manual therapy to mobilize soft tissue/joints of cervical/CT, scapular GHJ and UE for the purpose of decreasing headache, modulating pain, promoting relaxation,  increasing ROM, reducing/eliminating soft tissue swelling/inflammation/restriction, improving soft tissue extensibility and allowing for proper ROM for normal function with self care, reaching, carrying, lifting, house/yardwork, driving/computer work      Charges:  Timed Code Treatment Minutes: 45    Total Treatment Minutes: 45        [] EVAL - LOW (54271)   [] EVAL - MOD (69414)  [] EVAL - HIGH (26539)  [] RE-EVAL (08032)  [x] FU(62345) x  1      [] NMR (56310) x 1      [] Ionto  [x] Manual (18981) x      [] Ultrasound  [] TA x 1      [] Mech Traction (84309)  [] Home Management Training x   [] ES (un) (06712):           [] Aquatic    [] ES(attended) (27822)   [] Group    [x] Other: Dry needling    GOALS:  Patient stated goal: To improve cervical AROM   [x]? additional follow up with physician  [] Other    Persisting Functional Limitations/Impairments:  []Sitting []Standing   []Walking []Squatting/bending    []Stairs []ADL's    []Transfers []Reaching  []Housework []Lifting  [x]Driving []Job related tasks  []Sports/Recreation  []Sleeping  [x]Other: looking left     ASSESSMENT: Trialed TPDN of the upper trap this date. Pt does not have large twitch response, but does present with improved cervical L Rot AROM from 55 deg prior to needling to 62 deg after needling. Please follow up with response at npv. Continue to progress scapulothoracic strength and flexibility as able to tolerate. Treatment/Activity Tolerance:  [x] Patient able to complete tx  [] Patient limited by fatique  [] Patient limited by pain  [] Patient limited by other medical complications  [] Other:     Prognosis: [] Good [] Fair  [] Poor    Patient Requires Follow-up: [x] Yes  [] No    PLAN: See eval. PT 2x / week for 5 weeks. [] Continue per plan of care [] Alter current plan (see comments)  [x] Plan of care initiated [] Hold pending MD visit [] Discharge    Electronically signed by: Lena Councilman, PT , DPT      Note: If patient does not return for scheduled/ recommended follow up visits, this note will serve as a discharge from care along with most recent update on progress.

## 2021-10-19 ENCOUNTER — APPOINTMENT (OUTPATIENT)
Dept: PHYSICAL THERAPY | Age: 66
End: 2021-10-19
Payer: MEDICARE

## 2021-10-26 ENCOUNTER — HOSPITAL ENCOUNTER (OUTPATIENT)
Dept: PHYSICAL THERAPY | Age: 66
Setting detail: THERAPIES SERIES
Discharge: HOME | End: 2021-10-26
Payer: MEDICARE

## 2021-10-26 PROCEDURE — 97110 THERAPEUTIC EXERCISES: CPT

## 2021-10-26 PROCEDURE — 97112 NEUROMUSCULAR REEDUCATION: CPT

## 2021-10-26 NOTE — PROGRESS NOTES
[facility]  Phone: [phone]   Fax: [phone]    Physical Therapy Treatment Note/ Progress Report:     Date:  10/26/2021    Patient Name:  Terry Garcia    :  1955  MRN: [id number]  Restrictions/Precautions:    Medical/Treatment Diagnosis Information:  · Diagnosis: Spondylosis of cervical region without myelopathy or radiculopathy  · Treatment Diagnosis: Decrease cervical AROM, cervical joint restriction, left shoulder weakness  Insurance/Certification information:  PT Insurance Information: [facility] Medicare  Physician Information:  Referring Practitioner: Suad Pierre MD  Plan of care signed (Y/N): []  Yes [x]  No     Date of Patient follow up with Physician:      Progress Report: []  Yes  [x]  No     Date Range for reporting period:  Beginnin21  Ending:     Progress report due (10 Rx/or 30 days whichever is less): visit #00     Recertification due (POC duration/ or 90 days whichever is less): visit #     Visit # Insurance Allowable Auth required? Date Range    Mn []  Yes  [x]  No        Latex Allergy:  [x]NO      []YES  Preferred Language for Healthcare:   [x]English       []other:     Functional Scale:         Date assessed:  NDI: raw score =  5; dysfunction = 10%             10/11/2021  NDI: raw score = 8; dysfunction =16 %                                  2021       Pain level:  1/10     SUBJECTIVE:  Pt reports she thinks the DN didn't do too much last session. Says that soreness is there but not too bad.        OBJECTIVE: See eval   Observation:    Test measurements:    CERV ROM Left  Right    Cervical Flexion         65 pain vs 45     Cervical Extension 45 vs 40     Cervical SB 14 pain(mild) vs 45 40 pain (mild-L) vs 45 (mild pain - L)   Cervical rotation 69 (pain) vs 74 (mild pain - L) 77      Strength / Myotomes Left Right   Cervical Flexion (C1-2) 4 5   Cervical Side-bending (C3) 4 vs 5 5   Shoulder Shrug (C4) 5 5   Shoulder Flex 4 5 Shoulder Scap 4  5   Shoulder Abduction (C5) 4 (pain) 5   Shoulder ER 4 vs 4+ 5   Shoulder IR 4 vs 4+ 5          RESTRICTIONS/PRECAUTIONS:     Exercises/Interventions:   Therapeutic Exercise (81314) Resistance / level Sets/sec Reps Notes   UBE  2/2 fwd/bwd     Scap retraction  2 10    Mid Row 9/1    High Row 9/1   LPD 9/1   3 finger cerv rotation   10  8/27 added   CT supine   10    CT w/ rot R/L  supine   10 9/30 Added   Levator scap stretch  15\" 2 9/1 to the right    FM:  Mid Row  High Row   30#  30#   2  2   10  10 9/28   Cat camel  10/11 Added          Therapeutic Activities (95506)       Re-assessment   10/11   NDI    10/11                               NMR re-education (48687)       Prone over swiss ball:  Shld Ext  Row  Horiz Abd    2  2  2   10  10  10 9/8  Red ball  Lean over table  9/28   Prone retraction  2 10 9/8                        Manual Intervention (79681)       Cerv mobs/manip       Thoracic P-A mobs, thoracic distraction      , Direct extension mob sup, Mid-cervical locking supine, cervical manual traction      2nd rib mobilization       Cervical distraction and OA PROM flexion       Cervical counterstrain to left upper trap , mid-cervical mob  10 min     STM UT, cervical paraspinals, suboccipitals      Seated CT manip        Spoke with Dr. Fransisco Douglas  regarding the use of Dry Needling     Dry needling manual therapy: consisted on the placement of 2 needles in the following muscles:  L UT. A 40mm mm needle was inserted, piston, rotated, and coned to produce intramuscular mobilization. These techniques were used to restore functional range of motion, reduce muscle spasm and induce healing in the corresponding musculature. (79682)  Clean Technique was utilized today while applying Dry needling treatment. The treatment sites where cleaned with 70% solution of  isopropyl alcohol . The PT washed their hands and utilized treatment gloves along with hand  prior to inserting the needles. All needles where removed and discarded in the appropriate sharps container. MD has given verbal and/or written approval for this treatment. Modalities: 10/13, 9/1 MHP applied to c-spine with pt in supine x 10 min    Patient education:  -pt educated on diagnosis, prognosis and expectations for rehab  -all pt questions were answered    Home Exercise Program:   Access Code: [email or web address]  URL: [employer]/  Date: 08/23/2021  Prepared by: Lora Kevin    Exercises  Seated Scapular Retraction - 1 x daily - 7 x weekly - 3 sets - 10 reps - 2 hold  Seated Passive Cervical Retraction - 1 x daily - 7 x weekly - 3 sets - 10 reps - 2 hold    Updated 9/1  Access Code: [email or web address]  URL: [employer]/  Date: 09/01/2021  Prepared by: Chris Arango    Exercises  Standing Shoulder Row with Anchored Resistance - 1 x daily - 2 x weekly - 2 sets - 10 reps  Standing High Row with Resistance - 1 x daily - 2 x weekly - 2 sets - 10 reps  Shoulder extension with resistance - Neutral - 1 x daily - 2 x weekly - 2 sets - 10 reps        Therapeutic Exercise and NMR EXR  [x] (34277) Provided verbal/tactile cueing for activities related to strengthening, flexibility, endurance, ROM  for improvements in cervical, postural, scapular, scapulothoracic and UE control with self care, reaching, carrying, lifting, house/yardwork, driving/computer work.    [] (67254) Provided verbal/tactile cueing for activities related to improving balance, coordination, kinesthetic sense, posture, motor skill, proprioception  to assist with cervical, scapular, scapulothoracic and UE control with self care, reaching, carrying, lifting, house/yardwork, driving/computer work.  [] (80678) Therapist is in constant attendance of 2 or more patients providing skilled therapy interventions, but not providing any significant amount of measurable one-on-one time to either patient, for improvements in cervical, scapular, scapulothoracic and UE control with self care, reaching, carrying, lifting, house/yardwork, driving, computer work. Therapeutic Activities:    [] (03191 or 31639) Provided verbal/tactile cueing for activities related to improving balance, coordination, kinesthetic sense, posture, motor skill, proprioception and motor activation to allow for proper function of cervical, scapular, scapulothoracic and UE control with self care, carrying, lifting, driving/computer work.      Home Exercise Program:    [x] (51726) Reviewed/Progressed HEP activities related to strengthening, flexibility, endurance, ROM of cervical, scapular, scapulothoracic and UE control with self care, reaching, carrying, lifting, house/yardwork, driving/computer work  [] (47735) Reviewed/Progressed HEP activities related to improving balance, coordination, kinesthetic sense, posture, motor skill, proprioception of cervical, scapular, scapulothoracic and UE control with self care, reaching, carrying, lifting, house/yardwork, driving/computer work      Manual Treatments:  PROM / STM / Oscillations-Mobs:  G-I, II, III, IV (PA's, Inf., Post.)  [] (03469) Provided manual therapy to mobilize soft tissue/joints of cervical/CT, scapular GHJ and UE for the purpose of decreasing headache, modulating pain, promoting relaxation,  increasing ROM, reducing/eliminating soft tissue swelling/inflammation/restriction, improving soft tissue extensibility and allowing for proper ROM for normal function with self care, reaching, carrying, lifting, house/yardwork, driving/computer work      Charges:  Timed Code Treatment Minutes: 40   Total Treatment Minutes: 40       [] EVAL - LOW (34074)   [] EVAL - MOD (42694)  [] EVAL - HIGH (65244)  [] RE-EVAL (60230)  [x] TE(29148) x  2      [] NMR (71875) x       [] Ionto  [x] Manual (44760) x 1     [] Ultrasound  [] TA x       [] Mech Traction (06140)  [] Home Management Training x   [] ES (un) (58030):           [] Aquatic    [] ES(attended) (50994) [] Group    [x] Other: Dry needling    GOALS:  Patient stated goal: To improve cervical AROM   [x]? Progressing: []? Met: []? Not Met: []? Adjusted     Therapist goals for Patient:   Short Term Goals: To be achieved in: 2 weeks  1. Independent in HEP and progression per patient tolerance, in order to prevent re-injury. []? Progressing: [x]? Met: []? Not Met: []? Adjusted  2. Patient will have a decrease in pain to facilitate improvement in movement, function, and ADLs as indicated by Functional Deficits. [x]? Progressing: []? Met: []? Not Met: []? Adjusted     Long Term Goals: To be achieved in: 5 weeks  1. Disability index score of 10% or less for the NDI to assist with reaching prior level of function. []? Progressing: [x]? Met: []? Not Met: []? Adjusted  2. Patient will demonstrate increased AROM to cervical in left SB, rotation, extensionWFL of cervical/thoracic spine to allow for proper joint functioning as indicated by patients Functional Deficits. [x]? Progressing: []? Met: []? Not Met: []? Adjusted  3. Patient will demonstrate an increase in postural awareness and control and activation of  Deep cervical stabilizers to allow for proper functional mobility as indicated by patients Functional Deficits. []? Progressing: [x]? Met: []? Not Met: []? Adjusted  4. Patient will return to functional activities including  without increased symptoms or restriction. [x]? Progressing: []? Met: []? Not Met: []? Adjusted  5. Patient to be able to improve with left rotation /reduce pain with driving. [x]? Progressing: []? Met: []? Not Met: []? Adjusted            Overall Progression Towards Functional goals/ Treatment Progress Update:  [] Patient is progressing as expected towards functional goals listed. [] Progression is slowed due to complexities/Impairments listed. [] Progression has been slowed due to co-morbidities.   [x] Plan just implemented, too soon to assess goals progression <30days   [] Goals require adjustment due to lack of progress  [] Patient is not progressing as expected and requires additional follow up with physician  [] Other    Persisting Functional Limitations/Impairments:  []Sitting []Standing   []Walking []Squatting/bending    []Stairs []ADL's    []Transfers []Reaching  []Housework []Lifting  [x]Driving []Job related tasks  []Sports/Recreation  []Sleeping  [x]Other: looking left     ASSESSMENT:  10/26: Patient was able to continue with parascapular strengthening without complaints. She had noticeable increase tightness at left upper trap with palpation. She received counterstrain STM and mid-cervical mobilization, ISATM ( hawk  to cervical spine)  . Advised patient on changing pillow surface to reduce additional tension to cervical. Will monitor pt response next session and determine discharge if necessary. Trialed TPDN of the upper trap this date. Pt does not have large twitch response, but does present with improved cervical L Rot AROM from 55 deg prior to needling to 62 deg after needling. Please follow up with response at npv. Continue to progress scapulothoracic strength and flexibility as able to tolerate. Treatment/Activity Tolerance:  [x] Patient able to complete tx  [] Patient limited by fatique  [] Patient limited by pain  [] Patient limited by other medical complications  [] Other:     Prognosis: [] Good [] Fair  [] Poor    Patient Requires Follow-up: [x] Yes  [] No    PLAN: See eval. PT 2x / week for 5 weeks. [] Continue per plan of care [] Alter current plan (see comments)  [x] Plan of care initiated [] Hold pending MD visit [] Discharge    Electronically signed by: Johanna Berrios, PT , DPT      Note: If patient does not return for scheduled/ recommended follow up visits, this note will serve as a discharge from care along with most recent update on progress.

## 2021-11-09 ENCOUNTER — HOSPITAL ENCOUNTER (OUTPATIENT)
Dept: PHYSICAL THERAPY | Age: 66
Setting detail: THERAPIES SERIES
Discharge: HOME | End: 2021-11-09
Payer: COMMERCIAL

## 2021-11-09 PROCEDURE — 97110 THERAPEUTIC EXERCISES: CPT

## 2021-11-09 PROCEDURE — 97140 MANUAL THERAPY 1/> REGIONS: CPT

## 2021-11-09 NOTE — PROGRESS NOTES
Frederick Moss    :  1955  MRN: [id number]  Restrictions/Precautions:    Medical/Treatment Diagnosis Information:  · Diagnosis: Spondylosis of cervical region without myelopathy or radiculopathy  · Treatment Diagnosis: Decrease cervical AROM, cervical joint restriction, left shoulder weakness  Insurance/Certification information:  PT Insurance Information: [facility] Medicare  Physician Information:  Referring Practitioner: Soumya Bear MD  Plan of care signed (Y/N): []  Yes [x]  No     Date of Patient follow up with Physician:      Progress Report: []  Yes  [x]  No     Date Range for reporting period:  Beginnin21  Endin21    Progress report due (10 Rx/or 30 days whichever is less): visit #09     Recertification due (POC duration/ or 90 days whichever is less): visit #     Visit # Insurance Allowable Auth required? Date Range    Mn []  Yes  [x]  No        Latex Allergy:  [x]NO      []YES  Preferred Language for Healthcare:   [x]English       []other:     Functional Scale:         Date assessed:  NDI: raw score =  5; dysfunction = 10%             10/11/2021  NDI: raw score = 8; dysfunction =16 %                                  2021       Pain level:  1/10     SUBJECTIVE:  Pt reports that again that her ROM has improved but that her pain is about the same.      OBJECTIVE: See eval   Observation:    Test measurements:    CERV ROM Left  Right    Cervical Flexion         65 pain vs 45     Cervical Extension 45 vs 40     Cervical SB 14 pain(mild) vs 45 40 pain (mild-L) vs 45 (mild pain - L)   Cervical rotation 69 (pain) vs 74 (mild pain - L) 77      Strength / Myotomes Left Right   Cervical Flexion (C1-2) 4 5   Cervical Side-bending (C3) 4 vs 5 5   Shoulder Shrug (C4) 5 5   Shoulder Flex 4 5   Shoulder Scap 4  5   Shoulder Abduction (C5) 4 (pain) 5   Shoulder ER 4 vs 4+ 5   Shoulder IR 4 vs 4+ 5          RESTRICTIONS/PRECAUTIONS:     Exercises/Interventions:   Therapeutic Exercise (26632) Resistance / level Sets/sec Reps Notes   UBE  2/2 fwd/bwd     Scap retraction  2 10    Mid Row 9/1    High Row 9/1   LPD 9/1   3 finger cerv rotation   10  8/27 added   CT supine   10    CT w/ rot R/L  supine   10 9/30 Added   Levator scap stretch  15\" 2 9/1 to the right    FM:  Mid Row  High Row   30#  30#   2  2   10  10 9/28   Cat camel  10/11 Added          Therapeutic Activities (65117)       Re-assessment   10/11   NDI    10/11                               NMR re-education (85163)       Prone over swiss ball:  Shld Ext  Row  Horiz Abd    2  2  2   10  10  10 9/8  Red ball  Lean over table  9/28   Prone retraction  2 10 9/8                        Manual Intervention (28927)       Cerv mobs/manip       Thoracic P-A mobs, thoracic distraction, seated Thoracic manip, Left side first rib mobs,  23 min    , Direct extension mob sup, Mid-cervical locking supine, cervical manual traction      2nd rib mobilization       Cervical distraction and OA PROM flexion       Cervical counterstrain to left upper trap , mid-cervical mob  10 min     STM UT, cervical paraspinals, suboccipitals      Seated CT manip        Spoke with Dr. Mita Sahu  regarding the use of Dry Needling     Dry needling manual therapy: consisted on the placement of 2 needles in the following muscles:  L UT. A 40mm mm needle was inserted, piston, rotated, and coned to produce intramuscular mobilization. These techniques were used to restore functional range of motion, reduce muscle spasm and induce healing in the corresponding musculature. (86704)  Clean Technique was utilized today while applying Dry needling treatment. The treatment sites where cleaned with 70% solution of  isopropyl alcohol . The PT washed their hands and utilized treatment gloves along with hand  prior to inserting the needles. All needles where removed and discarded in the appropriate sharps container. MD has given verbal and/or written approval for this treatment. Modalities: 10/13, 9/1 MHP applied to c-spine with pt in supine x 10 min    Patient education:  -pt educated on diagnosis, prognosis and expectations for rehab  -all pt questions were answered    Home Exercise Program:   Access Code: [email or web address]  URL: [email or web address]  Date: 08/23/2021  Prepared by: Velma Glejose ramon    Exercises  Seated Scapular Retraction - 1 x daily - 7 x weekly - 3 sets - 10 reps - 2 hold  Seated Passive Cervical Retraction - 1 x daily - 7 x weekly - 3 sets - 10 reps - 2 hold    Updated 9/1  Access Code: [email or web address]  URL: [email or web address]  Date: 09/01/2021  Prepared by: Cherise Arellano    Exercises  Standing Shoulder Row with Anchored Resistance - 1 x daily - 2 x weekly - 2 sets - 10 reps  Standing High Row with Resistance - 1 x daily - 2 x weekly - 2 sets - 10 reps  Shoulder extension with resistance - Neutral - 1 x daily - 2 x weekly - 2 sets - 10 reps        Therapeutic Exercise and NMR EXR  [x] (08940) Provided verbal/tactile cueing for activities related to strengthening, flexibility, endurance, ROM  for improvements in cervical, postural, scapular, scapulothoracic and UE control with self care, reaching, carrying, lifting, house/yardwork, driving/computer work.    [] (42851) Provided verbal/tactile cueing for activities related to improving balance, coordination, kinesthetic sense, posture, motor skill, proprioception  to assist with cervical, scapular, scapulothoracic and UE control with self care, reaching, carrying, lifting, house/yardwork, driving/computer work.  [] (11879) Therapist is in constant attendance of 2 or more patients providing skilled therapy interventions, but not providing any significant amount of measurable one-on-one time to either patient, for improvements in cervical, scapular, scapulothoracic and UE control with self care, reaching, carrying, lifting, house/yardwork, driving, computer work.      Therapeutic Activities:    [] (85192 or 52783) Provided verbal/tactile cueing for activities related to improving balance, coordination, kinesthetic sense, posture, motor skill, proprioception and motor activation to allow for proper function of cervical, scapular, scapulothoracic and UE control with self care, carrying, lifting, driving/computer work. Home Exercise Program:    [x] (37564) Reviewed/Progressed HEP activities related to strengthening, flexibility, endurance, ROM of cervical, scapular, scapulothoracic and UE control with self care, reaching, carrying, lifting, house/yardwork, driving/computer work  [] (71486) Reviewed/Progressed HEP activities related to improving balance, coordination, kinesthetic sense, posture, motor skill, proprioception of cervical, scapular, scapulothoracic and UE control with self care, reaching, carrying, lifting, house/yardwork, driving/computer work      Manual Treatments:  PROM / STM / Oscillations-Mobs:  G-I, II, III, IV (PA's, Inf., Post.)  [] (22591) Provided manual therapy to mobilize soft tissue/joints of cervical/CT, scapular GHJ and UE for the purpose of decreasing headache, modulating pain, promoting relaxation,  increasing ROM, reducing/eliminating soft tissue swelling/inflammation/restriction, improving soft tissue extensibility and allowing for proper ROM for normal function with self care, reaching, carrying, lifting, house/yardwork, driving/computer work      Charges:  Timed Code Treatment Minutes: 43   Total Treatment Minutes: 43       [] EVAL - LOW (26883)   [] EVAL - MOD (67476)  [] EVAL - HIGH (02748)  [] RE-EVAL (00981)  [x] QN(93590) x 1     [] NMR (68386) x       [] Ionto  [x] Manual (72830) x 2   [] Ultrasound  [] TA x       [] Mech Traction (64190)  [] Home Management Training x   [] ES (un) (91597):           [] Aquatic    [] ES(attended) (89814)   [] Group    [] Other: Dry needling    GOALS:  Patient stated goal: To improve cervical AROM   [x]? Progressing: []? Met: []?  Not Met: []? Adjusted     Therapist goals for Patient:   Short Term Goals: To be achieved in: 2 weeks  1. Independent in HEP and progression per patient tolerance, in order to prevent re-injury. []? Progressing: [x]? Met: []? Not Met: []? Adjusted  2. Patient will have a decrease in pain to facilitate improvement in movement, function, and ADLs as indicated by Functional Deficits. []? Progressing: [x]? Met: []? Not Met: []? Adjusted     Long Term Goals: To be achieved in: 5 weeks  1. Disability index score of 10% or less for the NDI to assist with reaching prior level of function. []? Progressing: [x]? Met: []? Not Met: []? Adjusted  2. Patient will demonstrate increased AROM to cervical in left SB, rotation, extensionWFL of cervical/thoracic spine to allow for proper joint functioning as indicated by patients Functional Deficits. [x]? Progressing: [x]? Met: []? Not Met: []? Adjusted  3. Patient will demonstrate an increase in postural awareness and control and activation of  Deep cervical stabilizers to allow for proper functional mobility as indicated by patients Functional Deficits. []? Progressing: [x]? Met: []? Not Met: []? Adjusted  4. Patient will return to functional activities including  without increased symptoms or restriction. [x]? Progressing: []? Met: []? Not Met: []? Adjusted-Partially met pain reduce but continues to have minimal  pain with left rotation . 5. Patient to be able to improve with left rotation /reduce pain with driving.    []? Progressing: [x]? Met: []? Not Met: []? Adjusted            Overall Progression Towards Functional goals/ Treatment Progress Update:  [] Patient is progressing as expected towards functional goals listed. [] Progression is slowed due to complexities/Impairments listed. [] Progression has been slowed due to co-morbidities.   [x] Plan just implemented, too soon to assess goals progression <30days   [] Goals require adjustment due to lack of progress  [] Patient is not progressing as expected and requires additional follow up with physician  [] Other    Persisting Functional Limitations/Impairments:  []Sitting []Standing   []Walking []Squatting/bending    []Stairs []ADL's    []Transfers []Reaching  []Housework []Lifting  [x]Driving []Job related tasks  []Sports/Recreation  []Sleeping  [x]Other: looking left     ASSESSMENT:                     Treatment/Activity Tolerance:  [x] Patient able to complete tx  [] Patient limited by fatique  [] Patient limited by pain  [] Patient limited by other medical complications  [] Other:     Prognosis: [] Good [] Fair  [] Poor    Patient Requires Follow-up: [x] Yes  [] No    PLAN: See eval. PT 2x / week for 5 weeks. [] Continue per plan of care [] Alter current plan (see comments)  [] Plan of care initiated [] Hold pending MD visit [x] Discharge    Electronically signed by: Ruy Jiménez, PT , DPT      Note: If patient does not return for scheduled/ recommended follow up visits, this note will serve as a discharge from care along with most recent update on progress.

## 2021-11-22 ENCOUNTER — OFFICE VISIT (OUTPATIENT)
Dept: INTERNAL MEDICINE CLINIC | Age: 66
End: 2021-11-22
Payer: MEDICARE

## 2021-11-22 VITALS
SYSTOLIC BLOOD PRESSURE: 140 MMHG | DIASTOLIC BLOOD PRESSURE: 80 MMHG | HEART RATE: 65 BPM | HEIGHT: 64 IN | BODY MASS INDEX: 27.62 KG/M2 | WEIGHT: 161.8 LBS

## 2021-11-22 DIAGNOSIS — Z00.00 MEDICARE ANNUAL WELLNESS VISIT, SUBSEQUENT: Primary | ICD-10-CM

## 2021-11-22 DIAGNOSIS — E03.9 ACQUIRED HYPOTHYROIDISM: ICD-10-CM

## 2021-11-22 DIAGNOSIS — E78.5 HYPERLIPIDEMIA, UNSPECIFIED HYPERLIPIDEMIA TYPE: ICD-10-CM

## 2021-11-22 DIAGNOSIS — R03.0 ELEVATED BLOOD PRESSURE READING WITHOUT DIAGNOSIS OF HYPERTENSION: ICD-10-CM

## 2021-11-22 DIAGNOSIS — Z12.39 BREAST SCREENING: ICD-10-CM

## 2021-11-22 DIAGNOSIS — M47.812 SPONDYLOSIS OF CERVICAL REGION WITHOUT MYELOPATHY OR RADICULOPATHY: ICD-10-CM

## 2021-11-22 DIAGNOSIS — Z23 NEED FOR 23-POLYVALENT PNEUMOCOCCAL POLYSACCHARIDE VACCINE: ICD-10-CM

## 2021-11-22 LAB
A/G RATIO: 1.8 (ref 1.1–2.2)
ALBUMIN SERPL-MCNC: 4.6 G/DL (ref 3.4–5)
ALP BLD-CCNC: 68 U/L (ref 40–129)
ALT SERPL-CCNC: 17 U/L (ref 10–40)
ANION GAP SERPL CALCULATED.3IONS-SCNC: 12 MMOL/L (ref 3–16)
AST SERPL-CCNC: 24 U/L (ref 15–37)
BILIRUB SERPL-MCNC: 0.4 MG/DL (ref 0–1)
BUN BLDV-MCNC: 13 MG/DL (ref 7–20)
CALCIUM SERPL-MCNC: 9.6 MG/DL (ref 8.3–10.6)
CHLORIDE BLD-SCNC: 107 MMOL/L (ref 99–110)
CHOLESTEROL, TOTAL: 217 MG/DL (ref 0–199)
CO2: 26 MMOL/L (ref 21–32)
CREAT SERPL-MCNC: 0.7 MG/DL (ref 0.6–1.2)
GFR AFRICAN AMERICAN: >60
GFR NON-AFRICAN AMERICAN: >60
GLUCOSE BLD-MCNC: 86 MG/DL (ref 70–99)
HCT VFR BLD CALC: 40.5 % (ref 36–48)
HDLC SERPL-MCNC: 66 MG/DL (ref 40–60)
HEMOGLOBIN: 13.3 G/DL (ref 12–16)
LDL CHOLESTEROL CALCULATED: 127 MG/DL
MCH RBC QN AUTO: 29.6 PG (ref 26–34)
MCHC RBC AUTO-ENTMCNC: 32.8 G/DL (ref 31–36)
MCV RBC AUTO: 90.3 FL (ref 80–100)
PDW BLD-RTO: 13 % (ref 12.4–15.4)
PLATELET # BLD: 225 K/UL (ref 135–450)
PMV BLD AUTO: 9.9 FL (ref 5–10.5)
POTASSIUM SERPL-SCNC: 4.5 MMOL/L (ref 3.5–5.1)
RBC # BLD: 4.48 M/UL (ref 4–5.2)
SODIUM BLD-SCNC: 145 MMOL/L (ref 136–145)
T4 FREE: 1.9 NG/DL (ref 0.9–1.8)
TOTAL PROTEIN: 7.2 G/DL (ref 6.4–8.2)
TRIGL SERPL-MCNC: 118 MG/DL (ref 0–150)
TSH SERPL DL<=0.05 MIU/L-ACNC: 0.02 UIU/ML (ref 0.27–4.2)
VLDLC SERPL CALC-MCNC: 24 MG/DL
WBC # BLD: 5.1 K/UL (ref 4–11)

## 2021-11-22 PROCEDURE — G0009 ADMIN PNEUMOCOCCAL VACCINE: HCPCS | Performed by: INTERNAL MEDICINE

## 2021-11-22 PROCEDURE — 3017F COLORECTAL CA SCREEN DOC REV: CPT | Performed by: INTERNAL MEDICINE

## 2021-11-22 PROCEDURE — 1090F PRES/ABSN URINE INCON ASSESS: CPT | Performed by: INTERNAL MEDICINE

## 2021-11-22 PROCEDURE — 4040F PNEUMOC VAC/ADMIN/RCVD: CPT | Performed by: INTERNAL MEDICINE

## 2021-11-22 PROCEDURE — G8427 DOCREV CUR MEDS BY ELIG CLIN: HCPCS | Performed by: INTERNAL MEDICINE

## 2021-11-22 PROCEDURE — G8417 CALC BMI ABV UP PARAM F/U: HCPCS | Performed by: INTERNAL MEDICINE

## 2021-11-22 PROCEDURE — G8400 PT W/DXA NO RESULTS DOC: HCPCS | Performed by: INTERNAL MEDICINE

## 2021-11-22 PROCEDURE — 1036F TOBACCO NON-USER: CPT | Performed by: INTERNAL MEDICINE

## 2021-11-22 PROCEDURE — G0439 PPPS, SUBSEQ VISIT: HCPCS | Performed by: INTERNAL MEDICINE

## 2021-11-22 PROCEDURE — 1123F ACP DISCUSS/DSCN MKR DOCD: CPT | Performed by: INTERNAL MEDICINE

## 2021-11-22 PROCEDURE — 99213 OFFICE O/P EST LOW 20 MIN: CPT | Performed by: INTERNAL MEDICINE

## 2021-11-22 PROCEDURE — 90732 PPSV23 VACC 2 YRS+ SUBQ/IM: CPT | Performed by: INTERNAL MEDICINE

## 2021-11-22 PROCEDURE — G8484 FLU IMMUNIZE NO ADMIN: HCPCS | Performed by: INTERNAL MEDICINE

## 2021-11-22 ASSESSMENT — ENCOUNTER SYMPTOMS
CHEST TIGHTNESS: 0
VOMITING: 0
CONSTIPATION: 0
ABDOMINAL PAIN: 0
NAUSEA: 0
SORE THROAT: 0
BACK PAIN: 0
WHEEZING: 0
SHORTNESS OF BREATH: 0
COUGH: 0
COLOR CHANGE: 0

## 2021-11-22 ASSESSMENT — PATIENT HEALTH QUESTIONNAIRE - PHQ9
SUM OF ALL RESPONSES TO PHQ QUESTIONS 1-9: 0
1. LITTLE INTEREST OR PLEASURE IN DOING THINGS: 0
SUM OF ALL RESPONSES TO PHQ QUESTIONS 1-9: 0
SUM OF ALL RESPONSES TO PHQ QUESTIONS 1-9: 0
SUM OF ALL RESPONSES TO PHQ9 QUESTIONS 1 & 2: 0
2. FEELING DOWN, DEPRESSED OR HOPELESS: 0

## 2021-11-22 ASSESSMENT — LIFESTYLE VARIABLES
AUDIT-C TOTAL SCORE: 2
HOW OFTEN DO YOU HAVE A DRINK CONTAINING ALCOHOL: 2
HOW MANY STANDARD DRINKS CONTAINING ALCOHOL DO YOU HAVE ON A TYPICAL DAY: 0
HOW OFTEN DO YOU HAVE SIX OR MORE DRINKS ON ONE OCCASION: 0
HOW OFTEN DURING THE LAST YEAR HAVE YOU NEEDED AN ALCOHOLIC DRINK FIRST THING IN THE MORNING TO GET YOURSELF GOING AFTER A NIGHT OF HEAVY DRINKING: 0
HOW OFTEN DURING THE LAST YEAR HAVE YOU BEEN UNABLE TO REMEMBER WHAT HAPPENED THE NIGHT BEFORE BECAUSE YOU HAD BEEN DRINKING: 0
HOW OFTEN DURING THE LAST YEAR HAVE YOU HAD A FEELING OF GUILT OR REMORSE AFTER DRINKING: 0
AUDIT TOTAL SCORE: 2
HOW OFTEN DURING THE LAST YEAR HAVE YOU FOUND THAT YOU WERE NOT ABLE TO STOP DRINKING ONCE YOU HAD STARTED: 0
HAVE YOU OR SOMEONE ELSE BEEN INJURED AS A RESULT OF YOUR DRINKING: 0
HOW OFTEN DURING THE LAST YEAR HAVE YOU FAILED TO DO WHAT WAS NORMALLY EXPECTED FROM YOU BECAUSE OF DRINKING: 0
HAS A RELATIVE, FRIEND, DOCTOR, OR ANOTHER HEALTH PROFESSIONAL EXPRESSED CONCERN ABOUT YOUR DRINKING OR SUGGESTED YOU CUT DOWN: 0

## 2021-11-22 NOTE — ASSESSMENT & PLAN NOTE
Blood pressure checked twice still borderline, patient also reporting elevated readings at physical therapy and in the gym. Advised will do ambulatory blood pressure check over the next few weeks and will reevaluate after the holidays, will check lab work today, counseled regarding lifestyle modification changes including salt restriction, weight reduction, healthy diet and regular exercise, continue abstinence from smoking and alcohol, cut back on caffeine intake or other stimulants.   Educated about alarming signs and symptoms and advised if blood pressure readings are consistently high to call the office

## 2021-11-22 NOTE — PROGRESS NOTES
Medicare Annual Wellness Visit  Name: Aide Kemp Date: 2021   MRN: [id number] Sex: Female   Age: 77 y.o. Ethnicity: Non- / Non    : 1955 Race: White (non-)      Flynn Oppenheim is here for Medicare AWV and 3 Month Follow-Up (Pt reports that she still has neck/back pain, but physical therapy has been helping. She will be starting pilates soon.)     Screenings for behavioral, psychosocial and functional/safety risks, and cognitive dysfunction are all negative except as indicated below. These results, as well as other patient data from the [address] form, are documented in Flowsheets linked to this Encounter. No Known Allergiesic  Prior to Visit Medications    Medication Sig Taking? Authorizing Provider   levothyroxine (SYNTHROID) 100 MCG tablet Take 1 tablet by mouth Daily Yes Vitaly Rojas MD   ations    Medication Sig Taking? Authorizing Provider   levothyroxine (SYNTHROID) 100 MCG tablet Take 1 tablet by mouth Daily Yes Vitaly Rojas MD      Diagnosis Date    Hyperlipidemia     Hypothyroidism      Past Surgical History:   Procedure Laterality Date    DILATION AND CURETTAGE OF UTERUS      OTHER SURGICAL HISTORY  2019    pelvic prolapse       Family History   Problem Relation Age of Onset    Cancer Mother         Kidney    Alzheimer's Disease Father        CareTeam (Including outside providers/suppliers regularly involved in providing care):   Patient Care Team:  Vitaly Rojas MD as PCP - General (Internal Medicine)  Vitaly Rojas MD as PCP - REHABILITATION [facility] Empaneled Provider    Wt Readings from Last 3 Encounters:   21 161 lb 12.8 oz (73.4 kg)   21 159 lb 9.6 oz (72.4 kg)     Vitals:    21 0955 21 1004   BP: (!) 142/80 (!) 140/80   Pulse: 65    Weight: 161 lb 12.8 oz (73.4 kg)    Height: 5' 4\" (1.626 m)      Body mass index is 27.77 kg/m².     Based upon direct observation of the patient, evaluation of cognition reveals recent and remote memory intact. Review of Systems   Constitutional: Negative for activity change, appetite change, fatigue and unexpected weight change. HENT: Negative for congestion, hearing loss, mouth sores and sore throat. Respiratory: Negative for cough, chest tightness, shortness of breath and wheezing. Cardiovascular: Negative for chest pain, palpitations and leg swelling. Gastrointestinal: Negative for abdominal pain, constipation, nausea and vomiting. Genitourinary: Negative for difficulty urinating, dysuria, frequency, hematuria and urgency. Musculoskeletal: Positive for neck pain. Negative for arthralgias, back pain, gait problem, joint swelling and neck stiffness. Skin: Negative for color change. Allergic/Immunologic: Negative for environmental allergies and immunocompromised state. Neurological: Negative for dizziness, light-headedness and headaches. Psychiatric/Behavioral: Negative for behavioral problems and dysphoric mood. The patient is not nervous/anxious. Physical Exam  Constitutional:       General: She is not in acute distress. Appearance: Normal appearance. She is normal weight. She is not toxic-appearing. HENT:      Head: Normocephalic. Eyes:      Conjunctiva/sclera: Conjunctivae normal.   Cardiovascular:      Rate and Rhythm: Normal rate and regular rhythm. Heart sounds: Normal heart sounds. Pulmonary:      Effort: Pulmonary effort is normal. No respiratory distress. Breath sounds: No wheezing. Abdominal:      Palpations: Abdomen is soft. Musculoskeletal:         General: Normal range of motion. Cervical back: Neck supple. Skin:     General: Skin is warm and dry. Neurological:      General: No focal deficit present. Mental Status: She is alert. Cranial Nerves: No cranial nerve deficit.    Psychiatric:         Mood and Affect: Mood normal.          Patient's complete Health Risk Assessment and screening values have been reviewed and are found in [facility]. The following problems were reviewed today and where indicated follow up appointments were made and/or referrals ordered. Positive Risk Factor Screenings with Interventions:            General Health and ACP:  General  In general, how would you say your health is?: Good  In the past 7 days, have you experienced any of the following?  New or Increased Pain, New or Increased Fatigue, Loneliness, Social Isolation, Stress or Anger?: None of These  Do you get the social and emotional support that you need?: Yes  Do you have a Living Will?: (!) No  Advance Directives     Power of  Living Will ACP-Advance Directive ACP-Power of     Not on File Not on File Not on File Not on File      General Health Risk Interventions:  · No Living Will: Advance Care Planning addressed with patient today     Hearing/Vision:  No exam data present  Hearing/Vision  Do you or your family notice any trouble with your hearing that hasn't been managed with hearing aids?: (!) Yes  Do you have difficulty driving, watching TV, or doing any of your daily activities because of your eyesight?: No  Have you had an eye exam within the past year?: (!) No  Hearing/Vision Interventions:  · na    Safety:  Safety  Do you have working smoke detectors?: Yes  Have all throw rugs been removed or fastened?: Yes  Do you have non-slip mats or surfaces in all bathtubs/showers?: (!) No  Do all of your stairways have a railing or banister?: Yes  Are your doorways, halls and stairs free of clutter?: Yes  Do you always fasten your seatbelt when you are in a car?: Yes  Safety Interventions:  · not applicable       Personalized Preventive Plan   Current Health Maintenance Status  Immunization History   Administered Date(s) Administered    COVID-19, Alvarez Peter, PF, 30mcg/0.3mL 01/29/2021, 02/20/2021    Influenza Virus Vaccine 10/14/2011, 08/30/2012, 09/16/2020    Influenza, High-dose, Quadv, 65 yrs +, IM (Fluzone) 10/14/2021    Tdap (Boostrix, Adacel) 11/04/2014      Health Maintenance   Topic Date Due    Hepatitis C screen  Never done    Colon cancer screen colonoscopy  Never done    Shingles Vaccine (1 of 2) Never done    TSH testing  11/07/2016    Pneumococcal 65+ years Vaccine (1 of 1 - PPSV23) Never done    Breast cancer screen  06/12/2020    Annual Wellness Visit (AWV)  Never done    COVID-19 Vaccine (3 - Booster for Alvarez Peter series) 08/20/2021    DTaP/Tdap/Td vaccine (2 - Td or Tdap) 11/04/2024    Lipid screen  04/14/2026    DEXA (modify frequency per FRAX score)  Completed    Flu vaccine  Completed    Hepatitis A vaccine  Aged Out    Hepatitis B vaccine  Aged Out    Hib vaccine  Aged Out    Meningococcal (ACWY) vaccine  Aged Out     Recommendations for [email or web address] Due: see orders and patient instructions/AVS.    1. Medicare annual wellness visit, subsequent  2. Breast screening  -     [facility] VIKAS DIGITAL SCREEN BILATERAL; Future  3. Acquired hypothyroidism  Assessment & Plan:   Check thyroid function, continue current dose of thyroid replacement therapy  Orders:  -     Comprehensive Metabolic Panel; Future  -     Lipid Panel; Future  -     TSH without Reflex; Future  -     CBC; Future  -     T4, Free; Future  4. Hyperlipidemia, unspecified hyperlipidemia type  -     Comprehensive Metabolic Panel; Future  -     Lipid Panel; Future  -     CBC; Future  5. Elevated blood pressure reading without diagnosis of hypertension  Assessment & Plan:   Blood pressure checked twice still borderline, patient also reporting elevated readings at physical therapy and in the gym.   Advised will do ambulatory blood pressure check over the next few weeks and will reevaluate after the holidays, will check lab work today, counseled regarding lifestyle modification changes including salt restriction, weight reduction, healthy diet and regular exercise, continue abstinence from smoking and alcohol, cut back on caffeine intake or other stimulants. Educated about alarming signs and symptoms and advised if blood pressure readings are consistently high to call the office  6. Need for 23-polyvalent pneumococcal polysaccharide vaccine  -     Pneumococcal polysaccharide vaccine 23-valent greater than or equal to 3yo subcutaneous/IM  7. Spondylosis of cervical region without myelopathy or radiculopathy  Assessment & Plan:   Symptoms improved with physical therapy, encouraged to continue daily exercises and stretches since this did help with range of motion and to call the office if any new problems      Return in about 8 weeks (around 1/17/2022).     Recommended screening schedule for the next 5-10 years is provided to the patient in written form: see Patient Instructions/AVS.    Electronically signed by Leeroy Ahuja MD on 11/22/2021 at 10:46 AM.

## 2021-11-22 NOTE — ASSESSMENT & PLAN NOTE
Symptoms improved with physical therapy, encouraged to continue daily exercises and stretches since this did help with range of motion and to call the office if any new problems

## 2021-11-23 DIAGNOSIS — E03.9 ACQUIRED HYPOTHYROIDISM: Primary | ICD-10-CM

## 2021-11-23 RX ORDER — LEVOTHYROXINE SODIUM 88 UG/1
88 TABLET ORAL DAILY
Qty: 90 TABLET | Refills: 1 | Status: SHIPPED | OUTPATIENT
Start: 2021-11-23 | End: 2022-04-25 | Stop reason: DRUGHIGH

## 2022-01-12 ENCOUNTER — HOSPITAL ENCOUNTER (OUTPATIENT)
Dept: WOMENS IMAGING | Age: 67
Discharge: HOME | End: 2022-01-12
Payer: MEDICARE

## 2022-01-12 DIAGNOSIS — Z12.39 BREAST SCREENING: ICD-10-CM

## 2022-01-12 PROCEDURE — 77067 SCR MAMMO BI INCL CAD: CPT

## 2022-01-18 ENCOUNTER — OFFICE VISIT (OUTPATIENT)
Dept: INTERNAL MEDICINE CLINIC | Age: 67
End: 2022-01-18
Payer: MEDICARE

## 2022-01-18 VITALS
SYSTOLIC BLOOD PRESSURE: 150 MMHG | DIASTOLIC BLOOD PRESSURE: 80 MMHG | HEART RATE: 72 BPM | WEIGHT: 162 LBS | BODY MASS INDEX: 27.66 KG/M2 | HEIGHT: 64 IN

## 2022-01-18 DIAGNOSIS — M47.812 SPONDYLOSIS OF CERVICAL REGION WITHOUT MYELOPATHY OR RADICULOPATHY: ICD-10-CM

## 2022-01-18 DIAGNOSIS — Z53.20 COLON CANCER SCREENING DECLINED: ICD-10-CM

## 2022-01-18 DIAGNOSIS — R03.0 ELEVATED BLOOD PRESSURE READING WITHOUT DIAGNOSIS OF HYPERTENSION: ICD-10-CM

## 2022-01-18 DIAGNOSIS — E03.9 ACQUIRED HYPOTHYROIDISM: Primary | ICD-10-CM

## 2022-01-18 DIAGNOSIS — E03.9 ACQUIRED HYPOTHYROIDISM: ICD-10-CM

## 2022-01-18 LAB
T4 FREE: 1.8 NG/DL (ref 0.9–1.8)
TSH SERPL DL<=0.05 MIU/L-ACNC: 0.03 UIU/ML (ref 0.27–4.2)

## 2022-01-18 PROCEDURE — G8484 FLU IMMUNIZE NO ADMIN: HCPCS | Performed by: INTERNAL MEDICINE

## 2022-01-18 PROCEDURE — 4040F PNEUMOC VAC/ADMIN/RCVD: CPT | Performed by: INTERNAL MEDICINE

## 2022-01-18 PROCEDURE — 3017F COLORECTAL CA SCREEN DOC REV: CPT | Performed by: INTERNAL MEDICINE

## 2022-01-18 PROCEDURE — G8427 DOCREV CUR MEDS BY ELIG CLIN: HCPCS | Performed by: INTERNAL MEDICINE

## 2022-01-18 PROCEDURE — 1090F PRES/ABSN URINE INCON ASSESS: CPT | Performed by: INTERNAL MEDICINE

## 2022-01-18 PROCEDURE — 1036F TOBACCO NON-USER: CPT | Performed by: INTERNAL MEDICINE

## 2022-01-18 PROCEDURE — G8417 CALC BMI ABV UP PARAM F/U: HCPCS | Performed by: INTERNAL MEDICINE

## 2022-01-18 PROCEDURE — 99214 OFFICE O/P EST MOD 30 MIN: CPT | Performed by: INTERNAL MEDICINE

## 2022-01-18 PROCEDURE — 1123F ACP DISCUSS/DSCN MKR DOCD: CPT | Performed by: INTERNAL MEDICINE

## 2022-01-18 PROCEDURE — G8400 PT W/DXA NO RESULTS DOC: HCPCS | Performed by: INTERNAL MEDICINE

## 2022-01-18 ASSESSMENT — ENCOUNTER SYMPTOMS
SORE THROAT: 0
BACK PAIN: 0
COUGH: 0
NAUSEA: 0
CHEST TIGHTNESS: 0
SHORTNESS OF BREATH: 0
VOMITING: 0
CONSTIPATION: 0
ABDOMINAL PAIN: 0
COLOR CHANGE: 0
WHEEZING: 0

## 2022-01-18 NOTE — ASSESSMENT & PLAN NOTE
Improved with physical therapy, reached maximum allowance for 2021, encouraged to continue home exercises on her own and continue participating in the gym and regular exercise, if needed or acute relapse of symptoms can refer back to PT otherwise no further intervention

## 2022-01-18 NOTE — ASSESSMENT & PLAN NOTE
Blood pressure continues to be borderline elevated, patient states it was normal this morning when she was at her dentist office advised at this point recommend ambulatory blood pressure log, encouraged to get blood pressure machine and keep a log with blood pressure for the next 4 weeks and send the readings to the office to decide if she will need to be started on antihypertensive medications. Her lab work was recently checked and was within normal, reinforced recommendations for maintaining healthy low-salt low-fat diet, increase level of physical activity with the goal of regular exercise to further help regulate blood pressure, avoid high caffeine and alcohol intake.

## 2022-01-18 NOTE — PROGRESS NOTES
ASSESSMENT/PLAN:  1. Acquired hypothyroidism  Assessment & Plan:   Was recently adjusted and lowered to 80 MCG, will check labs if stable then we will continue current dose otherwise will adjust accordingly. patient continues to be asymptomatic, she is compliant with medications as instructed  Orders:  -     T4, Free; Future  -     TSH without Reflex; Future  2. Elevated blood pressure reading without diagnosis of hypertension  Assessment & Plan:   Blood pressure continues to be borderline elevated, patient states it was normal this morning when she was at her dentist office advised at this point recommend ambulatory blood pressure log, encouraged to get blood pressure machine and keep a log with blood pressure for the next 4 weeks and send the readings to the office to decide if she will need to be started on antihypertensive medications. Her lab work was recently checked and was within normal, reinforced recommendations for maintaining healthy low-salt low-fat diet, increase level of physical activity with the goal of regular exercise to further help regulate blood pressure, avoid high caffeine and alcohol intake. 3. Spondylosis of cervical region without myelopathy or radiculopathy  Assessment & Plan:   Improved with physical therapy, reached maximum allowance for 2021, encouraged to continue home exercises on her own and continue participating in the gym and regular exercise, if needed or acute relapse of symptoms can refer back to PT otherwise no further intervention  4. Colon cancer screening declined  Assessment & Plan:   Still declining referral to colonoscopy and wants to wait until the pandemic is over, I did explain to patient that she should not postpone any preventative healthcare measures and despite the pandemic precautions are made for patients to get healthcare completed safely.   Encouraged to call the office if changes her mind so that a referral can be placed for her      Return in about 3 months (around 4/18/2022). SUBJECTIVE  HPI:   Patient here to follow-up on hypothyroidism after adjusting her levothyroxine dose, follow-up on borderline blood pressure reading. States she has been doing well, she has been going to the [employer] and doing Pilates, she did complete PT for her neck with significant improvement although still has some residual pain      Review of Systems   Constitutional: Negative for activity change, appetite change and fatigue. HENT: Negative for congestion, hearing loss, mouth sores and sore throat. Respiratory: Negative for cough, chest tightness, shortness of breath and wheezing. Cardiovascular: Negative for chest pain, palpitations and leg swelling. Gastrointestinal: Negative for abdominal pain, constipation, nausea and vomiting. Genitourinary: Negative for difficulty urinating, dysuria, frequency, hematuria and urgency. Musculoskeletal: Positive for neck pain. Negative for arthralgias, back pain, gait problem and joint swelling. Skin: Negative for color change. Allergic/Immunologic: Negative for environmental allergies and immunocompromised state. Neurological: Negative for dizziness, light-headedness and headaches. Psychiatric/Behavioral: Negative for behavioral problems and dysphoric mood. OBJECTIVE:    BP (!) 150/80   Pulse 72   Ht 5' 4\" (1.626 m)   Wt 162 lb (73.5 kg)   BMI 27.81 kg/m²    Physical Exam  Constitutional:       General: She is not in acute distress. Appearance: Normal appearance. She is normal weight. She is not toxic-appearing. HENT:      Head: Normocephalic. Eyes:      Conjunctiva/sclera: Conjunctivae normal.   Cardiovascular:      Rate and Rhythm: Normal rate. Heart sounds: Normal heart sounds. Pulmonary:      Effort: Pulmonary effort is normal. No respiratory distress. Musculoskeletal:         General: Normal range of motion. Skin:     General: Skin is warm and dry. Neurological:      General: No focal deficit present. Mental Status: She is alert. Psychiatric:         Mood and Affect: Mood normal.           Electronically signed by Too Rm MD on 1/18/2022 at 11:19 AM.    This dictation was generated by voice recognition computer software. Although all attempts are made to edit the dictation for accuracy, there may be errors in the transcription that are not intended.

## 2022-01-18 NOTE — ASSESSMENT & PLAN NOTE
Was recently adjusted and lowered to 80 MCG, will check labs if stable then we will continue current dose otherwise will adjust accordingly.  patient continues to be asymptomatic, she is compliant with medications as instructed

## 2022-01-18 NOTE — ASSESSMENT & PLAN NOTE
Still declining referral to colonoscopy and wants to wait until the pandemic is over, I did explain to patient that she should not postpone any preventative healthcare measures and despite the pandemic precautions are made for patients to get healthcare completed safely.   Encouraged to call the office if changes her mind so that a referral can be placed for her

## 2022-04-18 ENCOUNTER — OFFICE VISIT (OUTPATIENT)
Dept: INTERNAL MEDICINE CLINIC | Age: 67
End: 2022-04-18
Payer: MEDICARE

## 2022-04-18 VITALS
HEIGHT: 64 IN | HEART RATE: 83 BPM | OXYGEN SATURATION: 98 % | WEIGHT: 149.2 LBS | DIASTOLIC BLOOD PRESSURE: 74 MMHG | BODY MASS INDEX: 25.47 KG/M2 | SYSTOLIC BLOOD PRESSURE: 130 MMHG

## 2022-04-18 DIAGNOSIS — M47.812 SPONDYLOSIS OF CERVICAL REGION WITHOUT MYELOPATHY OR RADICULOPATHY: ICD-10-CM

## 2022-04-18 DIAGNOSIS — E03.9 ACQUIRED HYPOTHYROIDISM: ICD-10-CM

## 2022-04-18 DIAGNOSIS — E03.9 ACQUIRED HYPOTHYROIDISM: Primary | ICD-10-CM

## 2022-04-18 PROBLEM — R03.0 ELEVATED BLOOD PRESSURE READING WITHOUT DIAGNOSIS OF HYPERTENSION: Status: RESOLVED | Noted: 2021-11-22 | Resolved: 2022-04-18

## 2022-04-18 PROBLEM — Z53.20 COLON CANCER SCREENING DECLINED: Status: RESOLVED | Noted: 2022-01-18 | Resolved: 2022-04-18

## 2022-04-18 LAB
HEPATITIS C ANTIBODY INTERPRETATION: NORMAL
T4 FREE: 2.1 NG/DL (ref 0.9–1.8)
TSH SERPL DL<=0.05 MIU/L-ACNC: 0.02 UIU/ML (ref 0.27–4.2)

## 2022-04-18 PROCEDURE — 4040F PNEUMOC VAC/ADMIN/RCVD: CPT | Performed by: INTERNAL MEDICINE

## 2022-04-18 PROCEDURE — 3017F COLORECTAL CA SCREEN DOC REV: CPT | Performed by: INTERNAL MEDICINE

## 2022-04-18 PROCEDURE — 1123F ACP DISCUSS/DSCN MKR DOCD: CPT | Performed by: INTERNAL MEDICINE

## 2022-04-18 PROCEDURE — G8417 CALC BMI ABV UP PARAM F/U: HCPCS | Performed by: INTERNAL MEDICINE

## 2022-04-18 PROCEDURE — 1090F PRES/ABSN URINE INCON ASSESS: CPT | Performed by: INTERNAL MEDICINE

## 2022-04-18 PROCEDURE — 99213 OFFICE O/P EST LOW 20 MIN: CPT | Performed by: INTERNAL MEDICINE

## 2022-04-18 PROCEDURE — 1036F TOBACCO NON-USER: CPT | Performed by: INTERNAL MEDICINE

## 2022-04-18 PROCEDURE — G8400 PT W/DXA NO RESULTS DOC: HCPCS | Performed by: INTERNAL MEDICINE

## 2022-04-18 PROCEDURE — G8427 DOCREV CUR MEDS BY ELIG CLIN: HCPCS | Performed by: INTERNAL MEDICINE

## 2022-04-18 ASSESSMENT — ENCOUNTER SYMPTOMS
WHEEZING: 0
SORE THROAT: 0
ABDOMINAL PAIN: 0
COUGH: 0
CONSTIPATION: 0
VOMITING: 0
BACK PAIN: 0
CHEST TIGHTNESS: 0
NAUSEA: 0
COLOR CHANGE: 0
SHORTNESS OF BREATH: 0

## 2022-04-18 NOTE — PROGRESS NOTES
ASSESSMENT/PLAN:  1. Acquired hypothyroidism  Assessment & Plan: We will update labs and adjust dose accordingly otherwise continue same dose and reevaluate in 6 months when due for yearly checkup  Orders:  -     T4, Free; Future  -     TSH; Future  -     Hepatitis C Antibody; Future  2. Spondylosis of cervical region without myelopathy or radiculopathy  Assessment & Plan:   Still with chronic neck pain although generally manageable and continues to do Pilates and physical exercise. No symptoms of radiculopathy, advised can use Tylenol and or NSAIDs as needed and reevaluate in 6 months or sooner if needed      Return in about 7 months (around 11/18/2022) for AWV please schedule after 11/22. SUBJECTIVE  HPI:   Patient here for 3 months follow-up on hypothyroidism after adjusting thyroid dose. Has been doing generally okay and denies any acute concerns      Review of Systems   Constitutional: Negative for activity change, appetite change and fatigue. HENT: Negative for congestion, hearing loss, mouth sores and sore throat. Respiratory: Negative for cough, chest tightness, shortness of breath and wheezing. Cardiovascular: Negative for chest pain, palpitations and leg swelling. Gastrointestinal: Negative for abdominal pain, constipation, nausea and vomiting. Genitourinary: Negative for difficulty urinating, dysuria, frequency, hematuria and urgency. Musculoskeletal: Positive for neck pain. Negative for arthralgias, back pain, gait problem and joint swelling. Skin: Negative for color change. Allergic/Immunologic: Negative for environmental allergies and immunocompromised state. Neurological: Negative for dizziness, light-headedness, numbness and headaches. Psychiatric/Behavioral: Negative for behavioral problems and dysphoric mood.        OBJECTIVE:    /74   Pulse 83   Ht 5' 4\" (1.626 m)   Wt 149 lb 3.2 oz (67.7 kg)   SpO2 98%   BMI 25.61 kg/m²    Physical Exam  Constitutional:

## 2022-04-18 NOTE — ASSESSMENT & PLAN NOTE
We will update labs and adjust dose accordingly otherwise continue same dose and reevaluate in 6 months when due for yearly checkup

## 2022-04-18 NOTE — ASSESSMENT & PLAN NOTE
Still with chronic neck pain although generally manageable and continues to do Pilates and physical exercise.   No symptoms of radiculopathy, advised can use Tylenol and or NSAIDs as needed and reevaluate in 6 months or sooner if needed

## 2022-04-19 DIAGNOSIS — E03.9 ACQUIRED HYPOTHYROIDISM: ICD-10-CM

## 2022-04-19 RX ORDER — LEVOTHYROXINE SODIUM 0.07 MG/1
75 TABLET ORAL DAILY
Qty: 90 TABLET | Refills: 1 | Status: CANCELLED | OUTPATIENT
Start: 2022-04-19

## 2022-04-20 ENCOUNTER — PATIENT MESSAGE (OUTPATIENT)
Dept: INTERNAL MEDICINE CLINIC | Age: 67
End: 2022-04-20

## 2022-04-20 DIAGNOSIS — E03.9 ACQUIRED HYPOTHYROIDISM: Primary | ICD-10-CM

## 2022-04-20 NOTE — TELEPHONE ENCOUNTER
From: Eduardo Ferrara  To: Dr. Sofie Jeronimo: 4/20/2022 4:11 PM EDT  Subject: medication for thyroid    MA Belinda - [facility] is what I prefer to go through. Thank you! Should I go ahead and call to make appointment for the lab work in 8 weeks?

## 2022-04-25 RX ORDER — LEVOTHYROXINE SODIUM 0.07 MG/1
75 TABLET ORAL DAILY
Qty: 90 TABLET | Refills: 0 | Status: SHIPPED | OUTPATIENT
Start: 2022-04-25 | End: 2022-07-12 | Stop reason: DRUGHIGH

## 2022-04-25 NOTE — TELEPHONE ENCOUNTER
Spoke with patient and answered patient's questions. Verified that she wanted new dose of Levothyroxine sent to [facility].

## 2022-07-06 DIAGNOSIS — E03.9 ACQUIRED HYPOTHYROIDISM: ICD-10-CM

## 2022-07-06 RX ORDER — LEVOTHYROXINE SODIUM 0.07 MG/1
TABLET ORAL
Qty: 90 TABLET | Refills: 3 | OUTPATIENT
Start: 2022-07-06

## 2022-07-08 ENCOUNTER — TELEPHONE (OUTPATIENT)
Dept: INTERNAL MEDICINE CLINIC | Age: 67
End: 2022-07-08

## 2022-07-08 DIAGNOSIS — E03.9 ACQUIRED HYPOTHYROIDISM: ICD-10-CM

## 2022-07-08 NOTE — TELEPHONE ENCOUNTER
Patient informed. She will come in next week. Has enough medication to last another week or so. Otherwise refill to [facility] is fine per patient. She does not have a local pharmacy.

## 2022-07-08 NOTE — TELEPHONE ENCOUNTER
Please advise needs to have lab work updated before refilling current dose.   We will approve 30 days so that she will not be out of medicine but she will not be getting any more refills until labs completed, please verify which local pharmacy she uses

## 2022-07-08 NOTE — TELEPHONE ENCOUNTER
This request was recently refused because patient did not have repeat thyroid labs done. Please advise.

## 2022-07-11 DIAGNOSIS — E03.9 ACQUIRED HYPOTHYROIDISM: Primary | ICD-10-CM

## 2022-07-11 DIAGNOSIS — E03.9 ACQUIRED HYPOTHYROIDISM: ICD-10-CM

## 2022-07-11 LAB
T4 FREE: 1.8 NG/DL (ref 0.9–1.8)
TSH SERPL DL<=0.05 MIU/L-ACNC: 0.05 UIU/ML (ref 0.27–4.2)

## 2022-07-12 DIAGNOSIS — E03.9 ACQUIRED HYPOTHYROIDISM: ICD-10-CM

## 2022-07-12 RX ORDER — LEVOTHYROXINE SODIUM 0.05 MG/1
50 TABLET ORAL DAILY
Qty: 90 TABLET | Refills: 0 | Status: SHIPPED | OUTPATIENT
Start: 2022-07-12 | End: 2022-07-18 | Stop reason: SDUPTHER

## 2022-07-18 DIAGNOSIS — E03.9 ACQUIRED HYPOTHYROIDISM: ICD-10-CM

## 2022-07-18 RX ORDER — LEVOTHYROXINE SODIUM 0.05 MG/1
50 TABLET ORAL DAILY
Qty: 90 TABLET | Refills: 0 | Status: SHIPPED | OUTPATIENT
Start: 2022-07-18 | End: 2022-09-27 | Stop reason: SDUPTHER

## 2022-09-15 ENCOUNTER — OFFICE VISIT (OUTPATIENT)
Dept: INTERNAL MEDICINE CLINIC | Age: 67
End: 2022-09-15
Payer: MEDICARE

## 2022-09-15 VITALS
DIASTOLIC BLOOD PRESSURE: 75 MMHG | SYSTOLIC BLOOD PRESSURE: 130 MMHG | HEART RATE: 77 BPM | BODY MASS INDEX: 23.97 KG/M2 | WEIGHT: 140.4 LBS | HEIGHT: 64 IN

## 2022-09-15 DIAGNOSIS — M51.37 DEGENERATION OF LUMBAR OR LUMBOSACRAL INTERVERTEBRAL DISC: Primary | ICD-10-CM

## 2022-09-15 PROCEDURE — 1036F TOBACCO NON-USER: CPT | Performed by: NURSE PRACTITIONER

## 2022-09-15 PROCEDURE — 1123F ACP DISCUSS/DSCN MKR DOCD: CPT | Performed by: NURSE PRACTITIONER

## 2022-09-15 PROCEDURE — G8427 DOCREV CUR MEDS BY ELIG CLIN: HCPCS | Performed by: NURSE PRACTITIONER

## 2022-09-15 PROCEDURE — 1090F PRES/ABSN URINE INCON ASSESS: CPT | Performed by: NURSE PRACTITIONER

## 2022-09-15 PROCEDURE — 3017F COLORECTAL CA SCREEN DOC REV: CPT | Performed by: NURSE PRACTITIONER

## 2022-09-15 PROCEDURE — G8420 CALC BMI NORM PARAMETERS: HCPCS | Performed by: NURSE PRACTITIONER

## 2022-09-15 PROCEDURE — G8400 PT W/DXA NO RESULTS DOC: HCPCS | Performed by: NURSE PRACTITIONER

## 2022-09-15 PROCEDURE — 99213 OFFICE O/P EST LOW 20 MIN: CPT | Performed by: NURSE PRACTITIONER

## 2022-09-15 ASSESSMENT — PATIENT HEALTH QUESTIONNAIRE - PHQ9
1. LITTLE INTEREST OR PLEASURE IN DOING THINGS: 0
SUM OF ALL RESPONSES TO PHQ QUESTIONS 1-9: 0
2. FEELING DOWN, DEPRESSED OR HOPELESS: 0
SUM OF ALL RESPONSES TO PHQ QUESTIONS 1-9: 0
SUM OF ALL RESPONSES TO PHQ QUESTIONS 1-9: 0
SUM OF ALL RESPONSES TO PHQ9 QUESTIONS 1 & 2: 0
SUM OF ALL RESPONSES TO PHQ QUESTIONS 1-9: 0

## 2022-09-15 NOTE — PROGRESS NOTES
9/15/22     Chief Complaint   Patient presents with    Pain     Back x3d     HPI    Here for right sided lower back pain   Started a few years ago but had been controlled  3-4 weeks ago it restarted when laying on right hip at piliates  Has been unable to get comfortable at night the past few nights, last night was a little better. She has a history of DDD. Pain was pretty bad yesterday morning and has improved some today   She does not take anything for the pain unless really bad   Has tried ice - not sure if it helped     No Known Allergies    Current Outpatient Medications   Medication Sig Dispense Refill    levothyroxine (SYNTHROID) 50 MCG tablet Take 1 tablet by mouth in the morning. 90 tablet 0     No current facility-administered medications for this visit. Review of Systems  Negative other than HPI     Vitals:    09/15/22 0858   BP: 130/75   Pulse: 77   Weight: 140 lb 6.4 oz (63.7 kg)   Height: 5' 4\" (1.626 m)      Physical Exam  Constitutional:       General: She is not in acute distress. Appearance: Normal appearance. HENT:      Head: Normocephalic and atraumatic. Musculoskeletal:      Lumbar back: Tenderness (right SI) present. No swelling, deformity or bony tenderness. Decreased range of motion. Skin:     General: Skin is warm and dry. Neurological:      Mental Status: She is alert and oriented to person, place, and time. Mental status is at baseline. Psychiatric:         Mood and Affect: Mood normal.         Behavior: Behavior normal.     Assessment/Plan:  Degeneration of lumbar or lumbosacral intervertebral disc  Acute exacerbation of chronic DDD  Discussed options in detail  Would like to avoid medication   Referral for PT   Okay to use lidocaine patch, prn nsaids, tylenol, ice / heat if needed   - Ambulatory referral to Physical Therapy    Discussed medications with patient, who voiced understanding of their use and indications. All questions answered.     FU criteria reviewed    Electronically signed by MADDISON Fink CNP on 9/15/2022 at 10:04 AM

## 2022-09-26 DIAGNOSIS — E03.9 ACQUIRED HYPOTHYROIDISM: ICD-10-CM

## 2022-09-26 LAB
T4 FREE: 1.2 NG/DL (ref 0.9–1.8)
TSH SERPL DL<=0.05 MIU/L-ACNC: 6.51 UIU/ML (ref 0.27–4.2)

## 2022-09-27 ENCOUNTER — TELEPHONE (OUTPATIENT)
Dept: INTERNAL MEDICINE CLINIC | Age: 67
End: 2022-09-27

## 2022-09-27 DIAGNOSIS — E03.9 ACQUIRED HYPOTHYROIDISM: ICD-10-CM

## 2022-09-27 RX ORDER — LEVOTHYROXINE SODIUM 0.05 MG/1
50 TABLET ORAL DAILY
Qty: 90 TABLET | Refills: 0 | Status: SHIPPED | OUTPATIENT
Start: 2022-09-27

## 2022-09-27 NOTE — TELEPHONE ENCOUNTER
Order sent however please make sure patient is aware the dose may still need to be changed at her follow-up appointment pending repeat lab results in November

## 2022-09-27 NOTE — TELEPHONE ENCOUNTER
Pt calling she needs new script or a call to resume her Levothyroxine with [facility]---she had them stop because she didn't know what was going on---thanks.

## 2022-09-28 ENCOUNTER — HOSPITAL ENCOUNTER (OUTPATIENT)
Dept: PHYSICAL THERAPY | Age: 67
Setting detail: THERAPIES SERIES
Discharge: HOME | End: 2022-09-28
Payer: MEDICARE

## 2022-09-28 PROCEDURE — 97140 MANUAL THERAPY 1/> REGIONS: CPT

## 2022-09-28 PROCEDURE — 97530 THERAPEUTIC ACTIVITIES: CPT

## 2022-09-28 PROCEDURE — 97161 PT EVAL LOW COMPLEX 20 MIN: CPT

## 2022-09-28 NOTE — PLAN OF CARE
[address] [address], [facility] [address]  Phone: [phone]   Fax: [phone]     Physical Therapy Certification    Dear MADDISON Miguel *  ,    We had the pleasure of evaluating the following patient for physical therapy services at [address] [place] [address]. A summary of our findings can be found in the initial assessment below. This includes our plan of care. If you have any questions or concerns regarding these findings, please do not hesitate to contact me at the office phone number checked above. Thank you for the referral.       Physician Signature:_______________________________Date:__________________  By signing above (or electronic signature), therapists plan is approved by physician      Patient: Maru Cannon   : 1955   MRN: [id number]  Referring Physician: MADDISON Miguel *        Evaluation Date: 2022      Medical Diagnosis Information:  Degeneration of lumbar or lumbosacral intervertebral disc [M51.37]   PT diagnosis:    Right inominate hypomobility, right ITB band trigger point tenderness                                      Insurance information: PT Insurance Information: [facility] Medicare complete     Precautions/ Contra-indications:   Latex Allergy:  [x]NO      []YES  Preferred Language for Healthcare:   [x]English       []Other:    C-SSRS Triggered by Intake questionnaire (Past 2 wk assessment ):   [x] No, Questionnaire did not trigger screening.   [] Yes, Patient intake triggered C-SSRS Screening     [] Completed, no further action required. [] Completed, PCP notified via Epic    SUBJECTIVE: Patient stated complaint: Patient reports that she noticed right hip joint pain several weeks ago after lifting performing yard work. Pt again noticed that she weakness in lifting her right leg during Pilates days later.  Pt admits that she had pain in left hip before but turns  out it was coming from lumbar spine after having and injection. Her goal is to get rid of the pain     Fear avoidance: I should not do physical activities that (might) make my pain worse   [x] True - certain Pilate movements   [] False     Relevant Medical History:  Functional Outcome: FOTO physical FS primary measure score = 51; Risk adjusted = 54    Pain Scale: 6-7/10  right hip   Easing factors: rest  Provocative factors:  steps, lying on right side, pulling    Type: [x]Constant   []Intermittent  []Radiating []Localized []other:     Numbness/Tingling: none    Occupation/School: retired    Living Status/Prior Level of Function: Prior to this injury / incident, pt was independent with ADLs and IADLs,  Patient lives with spouse in ranch style home with a basement .   Prior PT patient was active exercising participating in Pilates classes    OBJECTIVE:   Palpation: Tenderness over right IT band ( mid fibers)    Functional Mobility/Transfers: independent     Posture: genu valgum at  B knee's     Inspection: normal     Gait: (include devices/WB status)  decrease right rotation     Bandages/Dressings/Incisions: NA    Dermatomes Normal Abnormal Comments   inguinal area (L1)  x     anterior mid-thigh (L2) x     distal ant thigh/med knee (L3) x     medial lower leg and foot (L4) x     lateral lower leg and foot (L5) x     posterior calf (S1) x     medial calcaneus (S2) x         ROM  Comments   Lumbar Flex 110    Lumbar Ext 25 Stiffness      ROM LEFT RIGHT Comments   Lumbar Side Bend 23 30 Stiffness B    Lumbar Rotation      Hip Flexion      Hip Abd      Hip ER      Hip IR      Hip Extension      Knee Ext      Knee Flex      Hamstring Flex      Piriformis                      Joint mobility:    []Normal    [x]Hypo   []Hyper      Strength / Myotomes LEFT RIGHT Comments   Multifidus      Transverse Ab      Hip Flexors (L1-2) +4 4    Hip Abductors +4 4    Hip Extensors +4 4    Hip Internal Rotators +4 4    Hip External Rotators +4 4    Quads (L2-4) 5 +4    Hamstrings  5 +4    Ankle Plantarflexion (S1-2) 5 5    Ankle Dorsiflexion (L4-5) 5 5    Ankle Inversion 5 5    Ankle Eversion (S1-2)      Great Toe Extension (L5)          Neural dynamic tension testing Normal Abnormal Comments   Slump Test  - Degree of knee flexion:    Not addressed no radicular symptoms   SLR       0-30      30-70      Femoral nerve (L2-4)          Orthopedic Special Tests:    Normal Abnormal N/A Comments   Toe walk         Heel Walk       Fwd Bend-aberrant or innominate mvmt)       Standing Flexion test  x  Right inominate hypomobility   Trendelenburg       Kemps/Quadrant       Stork       BULL/Rubio x      Hip scour x      SLR       Crossed SLR       Supine to sit x      Hip thrust x      SI distraction/compression x      PA/Spring       Prone Instability test       Prone knee bend       Sacral Spring/thrust                  [x] Patient history, allergies, meds reviewed. Medical chart reviewed. See intake form. Review Of Systems (ROS):  [x]Performed Review of systems (Integumentary, CardioPulmonary, Neurological) by intake and observation. Intake form has been scanned into medical record. Patient has been instructed to contact their primary care physician regarding ROS issues if not already being addressed at this time.       Co-morbidities/Complexities (which will affect course of rehabilitation):   []None        []Hx of COVID   Arthritic conditions   []Rheumatoid arthritis (M05.9)  []Osteoarthritis (M19.91)  []Gout   Cardiovascular conditions   []Hypertension (I10)  []Hyperlipidemia (E78.5)  []Angina pectoris (I20)  []Atherosclerosis (I70)  []Pacemaker  []Hx of CABG/stent/  cardiac surgeries   Musculoskeletal conditions   []Disc pathology   []Congenital spine pathologies   []Osteoporosis (M81.8)  []Osteopenia (M85.8)  []Scoliosis       Endocrine conditions   []Hypothyroid (E03.9)  []Hyperthyroid Gastrointestinal conditions   []Constipation (X72.51)   Metabolic conditions []Morbid obesity (E66.01)  []Diabetes type 1(E10.65) or 2 (E11.65)   []Neuropathy (G60.9)     Cardio/Pulmonary conditions   []Asthma (J45)  []Coughing   []COPD (J44.9)  []CHF  []A-fib   Psychological Disorders  []Anxiety (F41.9)  []Depression (F32.9)   []Other:   Developmental Disorders  []Autism (F84.0)  []CP (G80)  []Down Syndrome (Q90.9)  []Developmental delay     Neurological conditions  []Prior Stroke (I69.30)  []Parkinson's (G20)  []Encephalopathy (G93.40)  []MS (G35)  []Post-polio (G14)  []SCI  []TBI  []ALS Other conditions  []Fibromyalgia (M79.7)  []Vertigo  []Syncope  []Kidney Failure  []Cancer      []currently undergoing                treatment  []Pregnancy  []Incontinence   Prior surgeries  []involved limb  []previous spinal surgery  [] section birth  []hysterectomy  []bowel / bladder surgery  []other relevant surgeries   []Other:               Barriers to/and or personal factors that will affect rehab potential:              []Age  []Sex    []Smoker              []Motivation/Lack of Motivation                        []Co-Morbidities              []Cognitive Function, education/learning barriers              []Environmental, home barriers              []profession/work barriers  []past PT/medical experience  []other:  Justification:     Falls Risk Assessment (30 days):   [x] Falls Risk assessed and no intervention required. [] Falls Risk assessed and Patient requires intervention due to being higher risk   TUG score (>12s at risk):     [] Falls education provided, including         ASSESSMENT: Patient is a 78 yo female who presents with right inominate hypomobility and right IT band trigger point tenderness .     Functional Impairments:     []Noted lumbar/proximal hip hypomobility   []Noted lumbosacral and/or generalized hypermobility   [x]Decreased Lumbosacral/hip/LE functional ROM   []Decreased core/proximal hip strength and neuromuscular control    [x]Decreased LE functional strength []Abnormal reflexes/sensation/myotomal/dermatomal deficits  []Reduced balance/proprioceptive control    []other:      Functional Activity Limitations (from functional questionnaire and intake)   []Reduced ability to tolerate prolonged functional positions   [x]Reduced ability or difficulty with changes of positions or transfers between positions   []Reduced ability to maintain good posture and demonstrate good body mechanics with sitting, bending, and lifting   [x]Reduced ability to sleep   [] Reduced ability or tolerance with driving and/or computer work   []Reduced ability to perform lifting, reaching, carrying tasks   [x]Reduced ability to squat   []Reduced ability to forward bend   []Reduced ability to ambulate prolonged functional periods/distances/surfaces   []Reduced ability to ascend/descend stairs   []other:       Participation Restrictions   []Reduced participation in self care activities   []Reduced participation in home management activities   []Reduced participation in work activities   [x]Reduced participation in social activities. [x]Reduced participation in sport/recreational activities. Classification:   []Signs/symptoms consistent with Lumbar instability/stabilization subgroup. []Signs/symptoms consistent with Lumbar mobilization/manipulation subgroup, myotomes and dermatomes intact. Meets manipulation criteria. []Signs/symptoms consistent with Lumbar direction specific/centralization subgroup   []Signs/symptoms consistent with Lumbar traction subgroup     []Signs/symptoms consistent with lumbar facet dysfunction   []Signs/symptoms consistent with lumbar stenosis type dysfunction   []Signs/symptoms consistent with nerve root involvement including myotome & dermatome dysfunction   []Signs/symptoms consistent with post-surgical status including: decreased ROM, strength and function.    [x]signs/symptoms consistent with pathology which may benefit from Dry needling     []other: Prognosis/Rehab Potential:      [x]Excellent   []Good    []Fair   []Poor    Tolerance of evaluation/treatment:    [x]Excellent   []Good    []Fair   []Poor     Physical Therapy Evaluation Complexity Justification  [x] A history of present problem with:  [] no personal factors and/or comorbidities that impact the plan of care;  []1-2 personal factors and/or comorbidities that impact the plan of care  []3 personal factors and/or comorbidities that impact the plan of care  [x] An examination of body systems using standardized tests and measures addressing any of the following: body structures and functions (impairments), activity limitations, and/or participation restrictions;:  [x] a total of 1-2 or more elements   [] a total of 3 or more elements   [] a total of 4 or more elements   [x] A clinical presentation with:  [x] stable and/or uncomplicated characteristics   [] evolving clinical presentation with changing characteristics  [] unstable and unpredictable characteristics;   [x] Clinical decision making of [] low, [x] moderate, [] high complexity using standardized patient assessment instrument and/or measurable assessment of functional outcome. [] EVAL (LOW) 85511 (typically 15 minutes face-to-face)  [x] EVAL (MOD) 13314 (typically 30 minutes face-to-face)  [] EVAL (HIGH) 73086 (typically 45 minutes face-to-face)  [] RE-EVAL     PLAN: Begin PT focusing on: proximal hip mobilizations, LB mobs, LB core activation, proximal hip activation, and HEP    Frequency/Duration:  2 days per week for 6 Weeks:  Interventions:  [x]  Therapeutic exercise including: strength training, ROM, for LE, Glutes and core   [x]  NMR activation and proprioception for glutes , LE and Core   [x]  Manual therapy as indicated for Hip complex, LE and spine to include: Dry Needling/IASTM, STM, PROM, Gr I-IV mobilizations, manipulation.    [x]  Modalities as needed that may include: thermal agents, E-stim, Biofeedback, US, iontophoresis as indicated  [x]  Patient education on joint protection, postural re-education, activity modification, progression of HEP. HEP instruction: Written HEP instructions provided and reviewed. Access Code: [email or web address]  URL: [email or web address]  Date: 09/29/2022  Prepared by: Josee Westbrook    Exercises  Supine Piriformis Stretch with Foot on Ground - 1 x daily - 7 x weekly - 1 sets - 3 reps - 20s hold  Supine ITB Stretch - 1 x daily - 7 x weekly - 1 sets - 3 reps - 20s hold      GOALS:  Patient stated goal: To improve   [] Progressing: [] Met: [] Not Met: [] Adjusted    Therapist goals for Patient:   Short Term Goals: To be achieved in: 2 weeks  1. Independent in HEP and progression per patient tolerance, in order to prevent re-injury. [] Progressing: [] Met: [] Not Met: [] Adjusted  2. Patient will have a decrease in pain to facilitate improvement in movement, function, and ADLs as indicated by Functional Deficits. [] Progressing: [] Met: [] Not Met: [] Adjusted    Long Term Goals: To be achieved in: 6 weeks  1. Patient to improve FOTO score of at least  69 to assist with reaching prior level of function. [] Progressing: [] Met: [] Not Met: [] Adjusted  2. Patient will demonstrate to prolong standing and walking> 20 min without pain . [] Progressing: [] Met: [] Not Met: [] Adjusted  3. Patient will demonstrate an increase in right hip Strength to +4/5 and core activation to allow for proper functional mobility as indicated by patients Functional Deficits. [] Progressing: [] Met: [] Not Met: [] Adjusted  4. Patient will return to functional activities including  participating in Pilates class without increased symptoms or restriction. [] Progressing: [] Met: [] Not Met: [] Adjusted  5. Patient to be able to lie on the right hip without pain or increase symptoms.    [] Progressing: [] Met: [] Not Met: [] Adjusted     Electronically signed by:  Josee Westbrook, PT

## 2022-09-28 NOTE — FLOWSHEET NOTE
[address] [facility]  Phone: [phone]   Fax: [phone]    Physical Therapy Daily Treatment Note    Date:  2022     Patient Name:  Jadon Rosas    :  1955  MRN: [id number]  Medical Diagnosis:  Degeneration of lumbar or lumbosacral intervertebral disc [M51.37]  Treatment Diagnosis:  Right inominate hypomobility, right ITB band trigger point tenderness                                      Insurance/Certification information:  PT Insurance Information: [facility] Medicare complete  Physician Information:  MADDISON Salcido *    Plan of care signed (Y/N): []  Yes [x]  No     Date of Patient follow up with Physician:      Progress Report: []  Yes  [x]  No     Date Range for reporting period:  Beginnin2022  Ending:     Progress report due (10 Rx/or 30 days whichever is less): visit #10 or  (date)     Recertification due (POC duration/ or 90 days whichever is less): visit # or  (date)     Visit # Insurance Allowable Auth required?  Date Range     []  Yes  [x]  No        Latex Allergy:  [x]NO      []YES  Preferred Language for Healthcare:   [x]English       []other:    Functional Scale:           Date assessed:  FOTO physical FS primary measure score = 51; risk adjusted = 54  2022    Pain level: 6-7 /10     SUBJECTIVE:  See eval    OBJECTIVE: See eval      RESTRICTIONS/PRECAUTIONS:      Exercises/Interventions:     Therapeutic Exercises (46055) Resistance / level Sets/sec Reps Notes   Bike       Hip flexor stretch  IT band stret       Hip strengthening                                                  Therapeutic Activities (81350)                                          Neuromuscular Re-ed (93869)                                                 Manual Intervention (95005)       STM to right IT band  Assess lumbopelvic region                                               Modalities:     Pt. Education:  2022  -patient educated on diagnosis, prognosis and expectations for rehab  -all patient questions were answered    Home Exercise Program:  Access Code: [email or web address]  URL: [email or web address]  Date: 09/29/2022  Prepared by: Regine Robles    Exercises  Supine Piriformis Stretch with Foot on Ground - 1 x daily - 7 x weekly - 1 sets - 3 reps - 20s hold  Supine ITB Stretch - 1 x daily - 7 x weekly - 1 sets - 3 reps - 20s hold        Therapeutic Exercise and NMR EXR  [] (81301) Provided verbal/tactile cueing for activities related to strengthening, flexibility, endurance, ROM for improvements in  [] LE / Lumbar: LE, proximal hip, and core control with self care, mobility, lifting, ambulation. [] UE / Cervical: cervical, postural, scapular, scapulothoracic and UE control with self care, reaching, carrying, lifting, house/yardwork, driving, computer work.  [] (97632) Provided verbal/tactile cueing for activities related to improving balance, coordination, kinesthetic sense, posture, motor skill, proprioception to assist with   [] LE / lumbar: LE, proximal hip, and core control in self care, mobility, lifting, ambulation and eccentric single leg control. [] UE / cervical: cervical, scapular, scapulothoracic and UE control with self care, reaching, carrying, lifting, house/yardwork, driving, computer work.   [] (05521) Therapist is in constant attendance of 2 or more patients providing skilled therapy interventions, but not providing any significant amount of measurable one-on-one time to either patient, for improvements in  [] LE / lumbar: LE, proximal hip, and core control in self care, mobility, lifting, ambulation and eccentric single leg control. [] UE / cervical: cervical, scapular, scapulothoracic and UE control with self care, reaching, carrying, lifting, house/yardwork, driving, computer work.      NMR and Therapeutic Activities:    [x] (38903 or 38728) Provided verbal/tactile cueing for activities related to improving balance, coordination, kinesthetic sense, posture, motor skill, proprioception and motor activation to allow for proper function of   [] LE: / Lumbar core, proximal hip and LE with self care and ADLs  [] UE / Cervical: cervical, postural, scapular, scapulothoracic and UE control with self care, carrying, lifting, driving, computer work.   [] (99165) Gait Re-education- Provided training and instruction to the patient for proper LE, core and proximal hip recruitment and positioning and eccentric body weight control with ambulation re-education including up and down stairs     Home Management Training / Self Care:  [] (70529) Provided self-care/home management training related to activities of daily living and compensatory training, and/or use of adaptive equipment for improvement with: ADLs and compensatory training, meal preparation, safety procedures and instruction in use of adaptive equipment, including bathing, grooming, dressing, personal hygiene, basic household cleaning and chores.      Home Exercise Program:    [x] (30681) Reviewed/Progressed HEP activities related to strengthening, flexibility, endurance, ROM of   [] LE / Lumbar: core, proximal hip and LE for functional self-care, mobility, lifting and ambulation/stair navigation   [] UE / Cervical: cervical, postural, scapular, scapulothoracic and UE control with self care, reaching, carrying, lifting, house/yardwork, driving, computer work  [] (62620)Reviewed/Progressed HEP activities related to improving balance, coordination, kinesthetic sense, posture, motor skill, proprioception of   [] LE: core, proximal hip and LE for self care, mobility, lifting, and ambulation/stair navigation    [] UE / Cervical: cervical, postural,  scapular, scapulothoracic and UE control with self care, reaching, carrying, lifting, house/yardwork, driving, computer work    Manual Treatments:  PROM / STM / Oscillations-Mobs:  G-I, II, III, IV (PA's, Inf., Post.)  [] (15780) Provided manual therapy to mobilize LE, proximal hip and/or LS spine soft tissue/joints for the purpose of modulating pain, promoting relaxation,  increasing ROM, reducing/eliminating soft tissue swelling/inflammation/restriction, improving soft tissue extensibility and allowing for proper ROM for normal function with   [] LE / lumbar: self care, mobility, lifting and ambulation. [] UE / Cervical: self care, reaching, carrying, lifting, house/yardwork, driving, computer work. Modalities:  [] (95216) Vasopneumatic compression: Utilized vasopneumatic compression to decrease edema / swelling for the purpose of improving mobility and quad tone / recruitment which will allow for increased overall function including but not limited to self-care, transfers, ambulation, and ascending / descending stairs. Charges:  Timed Code Treatment Minutes: 26   Total Treatment Minutes: 45     [x] EVAL - LOW (68054)   [] EVAL - MOD (16767)  [] EVAL - HIGH (31653)  [] RE-EVAL (42946)  [] LR(89435) x       [] Ionto  [] NMR (97092) x1       [] Vaso  [x] Manual (08079) x       [] Ultrasound  [x] TA x 1       [] Mech Traction (75137)  [] Aquatic Therapy x     [] ES (un) (62875):   [] Home Management Training x  [] ES(attended) (72002)   [] Dry Needling 1-2 muscles (75229):  [] Dry Needling 3+ muscles (388806)  [] Group:      [] Other:     GOALS:    Patient stated goal: To improve   [] Progressing: [] Met: [] Not Met: [] Adjusted     Therapist goals for Patient:   Short Term Goals: To be achieved in: 2 weeks  1. Independent in HEP and progression per patient tolerance, in order to prevent re-injury. [] Progressing: [] Met: [] Not Met: [] Adjusted  2. Patient will have a decrease in pain to facilitate improvement in movement, function, and ADLs as indicated by Functional Deficits. [] Progressing: [] Met: [] Not Met: [] Adjusted     Long Term Goals: To be achieved in: 6 weeks  1.  Patient to improve FOTO score of at least  69 to assist with reaching prior level of function. [] Progressing: [] Met: [] Not Met: [] Adjusted  2. Patient will demonstrate to prolong standing and walking> 20 min without pain . [] Progressing: [] Met: [] Not Met: [] Adjusted  3. Patient will demonstrate an increase in right hip Strength to +4/5 and core activation to allow for proper functional mobility as indicated by patients Functional Deficits. [] Progressing: [] Met: [] Not Met: [] Adjusted  4. Patient will return to functional activities including  participating in Pilates class without increased symptoms or restriction. [] Progressing: [] Met: [] Not Met: [] Adjusted  5. Patient to be able to lie on the right hip without pain or increase symptoms. [] Progressing: [] Met: [] Not Met: [] Adjusted       Overall Progression Towards Functional goals/ Treatment Progress Update:  [] Patient is progressing as expected towards functional goals listed. [] Progression is slowed due to complexities/Impairments listed. [] Progression has been slowed due to co-morbidities. [x] Plan just implemented, too soon to assess goals progression <30days   [] Goals require adjustment due to lack of progress  [] Patient is not progressing as expected and requires additional follow up with physician  [] Other    Persisting Functional Limitations/Impairments:  []Sleeping []Sitting               []Standing []Transfers        []Walking []Kneeling               []Stairs []Squatting / bending   []ADLs []Reaching  []Lifting  []Housework  []Driving []Job related tasks  []Sports/Recreation []Other:        ASSESSMENT:  See eval  Treatment/Activity Tolerance:  [] Patient able to complete tx [] Patient limited by fatigue  [] Patient limited by pain  [] Patient limited by other medical complications  [] Other:     Prognosis: [] Good [] Fair  [] Poor    Patient Requires Follow-up: [x] Yes  [] No    Plan for next treatment session:    PLAN: See eval. PT 2x / week for 6 weeks.    [] Continue per plan of care [] Alter current plan (see comments)  [x] Plan of care initiated [] Hold pending MD visit [] Discharge    Electronically signed by: Fan Joy, PT PT, DPT    Note: If patient does not return for scheduled/ recommended follow up visits, this note will serve as a discharge from care along with most recent update on progress.

## 2022-10-10 ENCOUNTER — HOSPITAL ENCOUNTER (OUTPATIENT)
Dept: PHYSICAL THERAPY | Age: 67
Setting detail: THERAPIES SERIES
Discharge: HOME | End: 2022-10-10
Payer: MEDICARE

## 2022-10-10 PROCEDURE — 97110 THERAPEUTIC EXERCISES: CPT

## 2022-10-10 PROCEDURE — 97530 THERAPEUTIC ACTIVITIES: CPT

## 2022-10-10 NOTE — FLOWSHEET NOTE
[address] [facility] Physical Therapy  Phone: [phone]   Fax: [phone]    Physical Therapy Daily Treatment Note    Date:  10/10/2022     Patient Name:  Clarke Winter    :  1955  MRN: [id number]  Medical Diagnosis:  Degeneration of lumbar or lumbosacral intervertebral disc [M51.37]  Treatment Diagnosis:  Right inominate hypomobility, right ITB band trigger point tenderness                                      Insurance/Certification information:  PT Insurance Information: [facility] Medicare complete  Physician Information:  MADDISON Cowart *    Plan of care signed (Y/N): []  Yes [x]  No     Date of Patient follow up with Physician:      Progress Report: []  Yes  [x]  No     Date Range for reporting period:  Beginnin2022  Ending:     Progress report due (10 Rx/or 30 days whichever is less): visit #10 or  (date)     Recertification due (POC duration/ or 90 days whichever is less): visit # or  (date)     Visit # Insurance Allowable Auth required? Date Range     []  Yes  [x]  No        Latex Allergy:  [x]NO      []YES  Preferred Language for Healthcare:   [x]English       []other:    Functional Scale:           Date assessed:  [facility] physical FS primary measure score = 51; risk adjusted = 54  2022    Pain level: 3 /10     SUBJECTIVE:  Reports her pain hasn't been too bad since last visit. States increased of symptoms when stretching in pilates. States she rode her bike a lot over a recent trip causing increased symptoms.      OBJECTIVE:   10/10 Lumbopelvic mobility WNL  Elana's + on right       RESTRICTIONS/PRECAUTIONS:      Exercises/Interventions:     Therapeutic Exercises (82952) Resistance / level Sets/sec Reps Notes   Nustep  5'     Sidelying hip abduction   1 15 HEP   Hip hikes  2 10 B HEP   Standing hip isometric abd  wall 2 45\" HEP   Lateral walks orange 3 8                                Therapeutic Activities (66977)       HEP progression   5' Education on stretching to pain tolerance and not overstretching, education on activity modification to pain tolerance   5'                          Neuromuscular Re-ed (77987)                                                 Manual Intervention (09769)       STM to right IT band, glute med, piriformis   Assess lumbopelvic region - WNL  3'    4'                                            Modalities:     Pt. Education:  9/28/2022  -patient educated on diagnosis, prognosis and expectations for rehab  -all patient questions were answered    Home Exercise Program:  Access Code: [email or web address]  URL: [email or web address]  Date: 09/29/2022  Prepared by: Diandra Douse    Exercises  Supine Piriformis Stretch with Foot on Ground - 1 x daily - 7 x weekly - 1 sets - 3 reps - 20s hold  Supine ITB Stretch - 1 x daily - 7 x weekly - 1 sets - 3 reps - 20s hold    Access Code: [email or web address]  URL: [email or web address]  Date: 10/10/2022  Prepared by: Leanora Lock Huml    Exercises  Supine Piriformis Stretch with Foot on Ground - 1 x daily - 7 x weekly - 1 sets - 3 reps - 20s hold  Supine ITB Stretch - 1 x daily - 7 x weekly - 1 sets - 3 reps - 20s hold  Sidelying Hip Abduction - 1 x daily - 7 x weekly - 2 sets - 10 reps  Standing Hip Hiking - 1 x daily - 7 x weekly - 2 sets - 10 reps  Isometric Gluteus Medius at Wall - 2-3 x daily - 7 x weekly - 1 sets - 5 reps - 45-60 hold          Therapeutic Exercise and NMR EXR  [x] (24775) Provided verbal/tactile cueing for activities related to strengthening, flexibility, endurance, ROM for improvements in  [x] LE / Lumbar: LE, proximal hip, and core control with self care, mobility, lifting, ambulation.   [] UE / Cervical: cervical, postural, scapular, scapulothoracic and UE control with self care, reaching, carrying, lifting, house/yardwork, driving, computer work.  [] (78784) Provided verbal/tactile cueing for activities related to improving balance, coordination, kinesthetic sense, posture, motor skill, proprioception to assist with   [] LE / lumbar: LE, proximal hip, and core control in self care, mobility, lifting, ambulation and eccentric single leg control. [] UE / cervical: cervical, scapular, scapulothoracic and UE control with self care, reaching, carrying, lifting, house/yardwork, driving, computer work.   [] (30391) Therapist is in constant attendance of 2 or more patients providing skilled therapy interventions, but not providing any significant amount of measurable one-on-one time to either patient, for improvements in  [] LE / lumbar: LE, proximal hip, and core control in self care, mobility, lifting, ambulation and eccentric single leg control. [] UE / cervical: cervical, scapular, scapulothoracic and UE control with self care, reaching, carrying, lifting, house/yardwork, driving, computer work.      NMR and Therapeutic Activities:    [x] (34949 or 65689) Provided verbal/tactile cueing for activities related to improving balance, coordination, kinesthetic sense, posture, motor skill, proprioception and motor activation to allow for proper function of   [] LE: / Lumbar core, proximal hip and LE with self care and ADLs  [] UE / Cervical: cervical, postural, scapular, scapulothoracic and UE control with self care, carrying, lifting, driving, computer work.   [] (65467) Gait Re-education- Provided training and instruction to the patient for proper LE, core and proximal hip recruitment and positioning and eccentric body weight control with ambulation re-education including up and down stairs     Home Management Training / Self Care:  [] (35965) Provided self-care/home management training related to activities of daily living and compensatory training, and/or use of adaptive equipment for improvement with: ADLs and compensatory training, meal preparation, safety procedures and instruction in use of adaptive equipment, including bathing, grooming, dressing, personal hygiene, basic household cleaning and chores. Home Exercise Program:    [x] (34719) Reviewed/Progressed HEP activities related to strengthening, flexibility, endurance, ROM of   [] LE / Lumbar: core, proximal hip and LE for functional self-care, mobility, lifting and ambulation/stair navigation   [] UE / Cervical: cervical, postural, scapular, scapulothoracic and UE control with self care, reaching, carrying, lifting, house/yardwork, driving, computer work  [] (47801)Reviewed/Progressed HEP activities related to improving balance, coordination, kinesthetic sense, posture, motor skill, proprioception of   [] LE: core, proximal hip and LE for self care, mobility, lifting, and ambulation/stair navigation    [] UE / Cervical: cervical, postural,  scapular, scapulothoracic and UE control with self care, reaching, carrying, lifting, house/yardwork, driving, computer work    Manual Treatments:  PROM / STM / Oscillations-Mobs:  G-I, II, III, IV (PA's, Inf., Post.)  [x] (18675) Provided manual therapy to mobilize LE, proximal hip and/or LS spine soft tissue/joints for the purpose of modulating pain, promoting relaxation,  increasing ROM, reducing/eliminating soft tissue swelling/inflammation/restriction, improving soft tissue extensibility and allowing for proper ROM for normal function with   [x] LE / lumbar: self care, mobility, lifting and ambulation. [] UE / Cervical: self care, reaching, carrying, lifting, house/yardwork, driving, computer work. Modalities:  [] (70152) Vasopneumatic compression: Utilized vasopneumatic compression to decrease edema / swelling for the purpose of improving mobility and quad tone / recruitment which will allow for increased overall function including but not limited to self-care, transfers, ambulation, and ascending / descending stairs.        Charges:  Timed Code Treatment Minutes: 45   Total Treatment Minutes: 45     [] EVAL - LOW (02598)   [] EVAL - MOD (58893)  [] EVAL - HIGH (67448)  [] RE-EVAL (53992)  [x] HU(74928) x  2     [] Ionto  [] NMR (35084) x       [] Vaso  [] Manual (67487) x       [] Ultrasound  [x] TA x 1       [] Mech Traction (60515)  [] Aquatic Therapy x     [] ES (un) (86879):   [] Home Management Training x  [] ES(attended) (52367)   [] Dry Needling 1-2 muscles (53095):  [] Dry Needling 3+ muscles (966601)  [] Group:      [] Other:     GOALS:    Patient stated goal: To improve   [] Progressing: [] Met: [] Not Met: [] Adjusted     Therapist goals for Patient:   Short Term Goals: To be achieved in: 2 weeks  1. Independent in HEP and progression per patient tolerance, in order to prevent re-injury. [] Progressing: [] Met: [] Not Met: [] Adjusted  2. Patient will have a decrease in pain to facilitate improvement in movement, function, and ADLs as indicated by Functional Deficits. [] Progressing: [] Met: [] Not Met: [] Adjusted     Long Term Goals: To be achieved in: 6 weeks  1. Patient to improve FOTO score of at least  69 to assist with reaching prior level of function. [] Progressing: [] Met: [] Not Met: [] Adjusted  2. Patient will demonstrate to prolong standing and walking> 20 min without pain . [] Progressing: [] Met: [] Not Met: [] Adjusted  3. Patient will demonstrate an increase in right hip Strength to +4/5 and core activation to allow for proper functional mobility as indicated by patients Functional Deficits. [] Progressing: [] Met: [] Not Met: [] Adjusted  4. Patient will return to functional activities including  participating in Pilates class without increased symptoms or restriction. [] Progressing: [] Met: [] Not Met: [] Adjusted  5. Patient to be able to lie on the right hip without pain or increase symptoms. [] Progressing: [] Met: [] Not Met: [] Adjusted       Overall Progression Towards Functional goals/ Treatment Progress Update:  [] Patient is progressing as expected towards functional goals listed.     [] Progression is slowed due to complexities/Impairments listed. [] Progression has been slowed due to co-morbidities. [x] Plan just implemented, too soon to assess goals progression <30days   [] Goals require adjustment due to lack of progress  [] Patient is not progressing as expected and requires additional follow up with physician  [] Other    Persisting Functional Limitations/Impairments:  []Sleeping []Sitting               []Standing []Transfers        [x]Walking []Kneeling               []Stairs []Squatting / bending   []ADLs []Reaching  []Lifting  []Housework  []Driving []Job related tasks  [x]Sports/Recreation []Other:        ASSESSMENT:  Pt with good tolerance to progressions of exercises per log. Progressed HEP with hip strengthening exercises. Educated on activity modification for pain tolerance and preventing over stretching. Pt presents with decreased hip strength during session. She will continue to progress from skilled therapy to return to PLOF pain free   Treatment/Activity Tolerance:  [x] Patient able to complete tx [] Patient limited by fatigue  [] Patient limited by pain  [] Patient limited by other medical complications  [] Other:     Prognosis: [x] Good [] Fair  [] Poor    Patient Requires Follow-up: [x] Yes  [] No    Plan for next treatment session:    PLAN: See eval. PT 2x / week for 6 weeks. [x] Continue per plan of care [] Alter current plan (see comments)  [] Plan of care initiated [] Hold pending MD visit [] Discharge    Electronically signed by: Alek Adame, PT PT, DPT  Therapist was present, directed the patient's care, made skilled judgement, and was responsible for assessment and treatment of the patient. Jude Singer, SPT     Note: If patient does not return for scheduled/ recommended follow up visits, this note will serve as a discharge from care along with most recent update on progress.

## 2022-10-13 ENCOUNTER — HOSPITAL ENCOUNTER (OUTPATIENT)
Dept: PHYSICAL THERAPY | Age: 67
Setting detail: THERAPIES SERIES
Discharge: HOME | End: 2022-10-13
Payer: MEDICARE

## 2022-10-13 PROCEDURE — 97530 THERAPEUTIC ACTIVITIES: CPT

## 2022-10-13 PROCEDURE — 97110 THERAPEUTIC EXERCISES: CPT

## 2022-10-13 NOTE — FLOWSHEET NOTE
[address] [facility] Physical Therapy  Phone: [phone]   Fax: [phone]    Physical Therapy Daily Treatment Note    Date:  10/13/2022     Patient Name:  Agustin Betancourt    :  1955  MRN: [id number]  Medical Diagnosis:  Degeneration of lumbar or lumbosacral intervertebral disc [M51.37]  Treatment Diagnosis:  Right inominate hypomobility, right ITB band trigger point tenderness                                      Insurance/Certification information:  PT Insurance Information: [facility] Medicare complete  Physician Information:  MADDISON Ferrer *    Plan of care signed (Y/N): []  Yes [x]  No     Date of Patient follow up with Physician:      Progress Report: []  Yes  [x]  No     Date Range for reporting period:  Beginnin2022  Ending:     Progress report due (10 Rx/or 30 days whichever is less): visit #10 or  (date)     Recertification due (POC duration/ or 90 days whichever is less): visit # or  (date)     Visit # Insurance Allowable Auth required? Date Range   3/12  []  Yes  [x]  No        Latex Allergy:  [x]NO      []YES  Preferred Language for Healthcare:   [x]English       []other:    Functional Scale:           Date assessed:  [facility] physical FS primary measure score = 51; risk adjusted = 54  2022    Pain level: 2/10     SUBJECTIVE:  Pt states pain had been getting better, states she did yoga yesterday, felt better during, increased pain with SLS, feels unstable.      OBJECTIVE:     10/10 Lumbopelvic mobility WNL  Elana's + on right       RESTRICTIONS/PRECAUTIONS:      Exercises/Interventions:     Therapeutic Exercises (22168) Resistance / level Sets/sec Reps Notes   Nustep    Sidelying hip abduction  HEP   Hip hikes HEP   Standing hip isometric abd  HEP   Lateral walks    [facility]:   Leg Press  Leg Ext  Hamstring curl  Hip abduction  Row  Chest press  Free motion lat pull down  Stairs up to [facility] with no difficulty    100-120lbs  40 lbs  40 lbs  20 lbs  30 lbs  30 lbs  30 lbs     1  1  1  1  1  1  1   10  10  10  10  10  10  10   HEP                         Therapeutic Activities (13120)       HEP progression: [facility] to [facility] and showed pt different exercises to do on her own  22'     Education on stretching to pain tolerance and not overstretching, education on activity modification to pain tolerance   5'                          Neuromuscular Re-ed (59529)                                                 Manual Intervention (62174)       STM to right IT band, glute med, piriformis   Assess lumbopelvic region - WNL                                             Modalities:     Pt. Education:  9/28/2022  -patient educated on diagnosis, prognosis and expectations for rehab  -all patient questions were answered    Home Exercise Program:  Access Code: [email or web address]  URL: [email or web address]  Date: 09/29/2022  Prepared by: Major Saenz    Exercises  Supine Piriformis Stretch with Foot on Ground - 1 x daily - 7 x weekly - 1 sets - 3 reps - 20s hold  Supine ITB Stretch - 1 x daily - 7 x weekly - 1 sets - 3 reps - 20s hold    Access Code: [email or web address]  URL: [email or web address]  Date: 10/10/2022  Prepared by: Marciano Dandy Huml    Exercises  Supine Piriformis Stretch with Foot on Ground - 1 x daily - 7 x weekly - 1 sets - 3 reps - 20s hold  Supine ITB Stretch - 1 x daily - 7 x weekly - 1 sets - 3 reps - 20s hold  Sidelying Hip Abduction - 1 x daily - 7 x weekly - 2 sets - 10 reps  Standing Hip Hiking - 1 x daily - 7 x weekly - 2 sets - 10 reps  Isometric Gluteus Medius at Wall - 2-3 x daily - 7 x weekly - 1 sets - 5 reps - 45-60 hold          Therapeutic Exercise and NMR EXR  [x] (81073) Provided verbal/tactile cueing for activities related to strengthening, flexibility, endurance, ROM for improvements in  [x] LE / Lumbar: LE, proximal hip, and core control with self care, mobility, lifting, ambulation.   [] UE / Cervical: cervical, postural, scapular, scapulothoracic and UE control with self care, reaching, carrying, lifting, house/yardwork, driving, computer work.  [] (72386) Provided verbal/tactile cueing for activities related to improving balance, coordination, kinesthetic sense, posture, motor skill, proprioception to assist with   [] LE / lumbar: LE, proximal hip, and core control in self care, mobility, lifting, ambulation and eccentric single leg control. [] UE / cervical: cervical, scapular, scapulothoracic and UE control with self care, reaching, carrying, lifting, house/yardwork, driving, computer work.   [] (99110) Therapist is in constant attendance of 2 or more patients providing skilled therapy interventions, but not providing any significant amount of measurable one-on-one time to either patient, for improvements in  [] LE / lumbar: LE, proximal hip, and core control in self care, mobility, lifting, ambulation and eccentric single leg control. [] UE / cervical: cervical, scapular, scapulothoracic and UE control with self care, reaching, carrying, lifting, house/yardwork, driving, computer work.      NMR and Therapeutic Activities:    [x] (60245 or 29194) Provided verbal/tactile cueing for activities related to improving balance, coordination, kinesthetic sense, posture, motor skill, proprioception and motor activation to allow for proper function of   [] LE: / Lumbar core, proximal hip and LE with self care and ADLs  [] UE / Cervical: cervical, postural, scapular, scapulothoracic and UE control with self care, carrying, lifting, driving, computer work.   [] (10890) Gait Re-education- Provided training and instruction to the patient for proper LE, core and proximal hip recruitment and positioning and eccentric body weight control with ambulation re-education including up and down stairs     Home Management Training / Self Care:  [] (10601) Provided self-care/home management training related to activities of daily living and compensatory training, and/or use of adaptive equipment for improvement with: ADLs and compensatory training, meal preparation, safety procedures and instruction in use of adaptive equipment, including bathing, grooming, dressing, personal hygiene, basic household cleaning and chores. Home Exercise Program:    [x] (28939) Reviewed/Progressed HEP activities related to strengthening, flexibility, endurance, ROM of   [] LE / Lumbar: core, proximal hip and LE for functional self-care, mobility, lifting and ambulation/stair navigation   [] UE / Cervical: cervical, postural, scapular, scapulothoracic and UE control with self care, reaching, carrying, lifting, house/yardwork, driving, computer work  [] (09653)Reviewed/Progressed HEP activities related to improving balance, coordination, kinesthetic sense, posture, motor skill, proprioception of   [] LE: core, proximal hip and LE for self care, mobility, lifting, and ambulation/stair navigation    [] UE / Cervical: cervical, postural,  scapular, scapulothoracic and UE control with self care, reaching, carrying, lifting, house/yardwork, driving, computer work    Manual Treatments:  PROM / STM / Oscillations-Mobs:  G-I, II, III, IV (PA's, Inf., Post.)  [] (69051) Provided manual therapy to mobilize LE, proximal hip and/or LS spine soft tissue/joints for the purpose of modulating pain, promoting relaxation,  increasing ROM, reducing/eliminating soft tissue swelling/inflammation/restriction, improving soft tissue extensibility and allowing for proper ROM for normal function with   [] LE / lumbar: self care, mobility, lifting and ambulation. [] UE / Cervical: self care, reaching, carrying, lifting, house/yardwork, driving, computer work.      Modalities:  [] (88598) Vasopneumatic compression: Utilized vasopneumatic compression to decrease edema / swelling for the purpose of improving mobility and quad tone / recruitment which will allow for increased overall function including but not limited to self-care, transfers, ambulation, and ascending / descending stairs. Charges:  Timed Code Treatment Minutes: 45   Total Treatment Minutes: 45     [] EVAL - LOW (03372)   [] EVAL - MOD (02835)  [] EVAL - HIGH (72033)  [] RE-EVAL (92714)  [x] QG(68985) x  1     [] Ionto  [] NMR (01983) x       [] Vaso  [] Manual (97105) x       [] Ultrasound  [x] TA x 2     [] Mech Traction (38149)  [] Aquatic Therapy x     [] ES (un) (26693):   [] Home Management Training x  [] ES(attended) (26187)   [] Dry Needling 1-2 muscles (83363):  [] Dry Needling 3+ muscles (098868)  [] Group:      [] Other:     GOALS:    Patient stated goal: To improve   [] Progressing: [] Met: [] Not Met: [] Adjusted     Therapist goals for Patient:   Short Term Goals: To be achieved in: 2 weeks  1. Independent in HEP and progression per patient tolerance, in order to prevent re-injury. [] Progressing: [] Met: [] Not Met: [] Adjusted  2. Patient will have a decrease in pain to facilitate improvement in movement, function, and ADLs as indicated by Functional Deficits. [] Progressing: [] Met: [] Not Met: [] Adjusted     Long Term Goals: To be achieved in: 6 weeks  1. Patient to improve FOTO score of at least  69 to assist with reaching prior level of function. [] Progressing: [] Met: [] Not Met: [] Adjusted  2. Patient will demonstrate to prolong standing and walking> 20 min without pain . [] Progressing: [] Met: [] Not Met: [] Adjusted  3. Patient will demonstrate an increase in right hip Strength to +4/5 and core activation to allow for proper functional mobility as indicated by patients Functional Deficits. [] Progressing: [] Met: [] Not Met: [] Adjusted  4. Patient will return to functional activities including  participating in Pilates class without increased symptoms or restriction. [] Progressing: [] Met: [] Not Met: [] Adjusted  5. Patient to be able to lie on the right hip without pain or increase symptoms.    [] Progressing: [] Met: [] Not Met: [] Adjusted       Overall Progression Towards Functional goals/ Treatment Progress Update:  [] Patient is progressing as expected towards functional goals listed. [] Progression is slowed due to complexities/Impairments listed. [] Progression has been slowed due to co-morbidities. [x] Plan just implemented, too soon to assess goals progression <30days   [] Goals require adjustment due to lack of progress  [] Patient is not progressing as expected and requires additional follow up with physician  [] Other    Persisting Functional Limitations/Impairments:  []Sleeping []Sitting               []Standing []Transfers        [x]Walking []Kneeling               []Stairs []Squatting / bending   []ADLs []Reaching  []Lifting  []Housework  []Driving []Job related tasks  [x]Sports/Recreation []Other:        ASSESSMENT:  [facility] exercises to complete on her own to strengthen her hips and leg musculature. Pt with good form and technique to exercises and stated understanding. Hip pain is better with progression of exercises. Continue PT to return to PLOF with reduced pain. Treatment/Activity Tolerance:  [x] Patient able to complete tx [] Patient limited by fatigue  [] Patient limited by pain  [] Patient limited by other medical complications  [] Other:     Prognosis: [x] Good [] Fair  [] Poor    Patient Requires Follow-up: [x] Yes  [] No    Plan for next treatment session:    PLAN: See eval. PT 2x / week for 6 weeks. [x] Continue per plan of care [] Alter current plan (see comments)  [] Plan of care initiated [] Hold pending MD visit [] Discharge    Electronically signed by: Andres Bautista PT PT, DPT  Therapist was present, directed the patient's care, made skilled judgement, and was responsible for assessment and treatment of the patient.    Bill Casas, SPT     Note: If patient does not return for scheduled/ recommended follow up visits, this note will serve as a discharge from care along with most recent update on progress.

## 2022-10-17 ENCOUNTER — HOSPITAL ENCOUNTER (OUTPATIENT)
Dept: PHYSICAL THERAPY | Age: 67
Setting detail: THERAPIES SERIES
Discharge: HOME | End: 2022-10-17
Payer: MEDICARE

## 2022-10-17 PROCEDURE — 97110 THERAPEUTIC EXERCISES: CPT

## 2022-10-17 NOTE — FLOWSHEET NOTE
[address] [facility] Physical Therapy  Phone: [phone]   Fax: [phone]    Physical Therapy Daily Treatment Note    Date:  10/17/2022     Patient Name:  Tawanna Decker    :  1955  MRN: [id number]  Medical Diagnosis:  Degeneration of lumbar or lumbosacral intervertebral disc [M51.37]  Treatment Diagnosis:  Right inominate hypomobility, right ITB band trigger point tenderness                                      Insurance/Certification information:  PT Insurance Information: [facility] Medicare complete  Physician Information:  MADDISON Huggins *    Plan of care signed (Y/N): []  Yes [x]  No     Date of Patient follow up with Physician:      Progress Report: []  Yes  [x]  No     Date Range for reporting period:  Beginnin2022  Ending:     Progress report due (10 Rx/or 30 days whichever is less): visit #10 or  (date)     Recertification due (POC duration/ or 90 days whichever is less): visit # or  (date)     Visit # Insurance Allowable Auth required? Date Range     []  Yes  [x]  No        Latex Allergy:  [x]NO      []YES  Preferred Language for Healthcare:   [x]English       []other:    Functional Scale:           Date assessed:  TO physical FS primary measure score = 51; risk adjusted = 54  2022    Pain level: 0/10     SUBJECTIVE:  Pt states she is doing a lot better, considering D/C on Monday. Went to [employer] and did exercises over the weekend. Wants to go to [facility] npv to make sure she is completing exercises right.      OBJECTIVE:   10/17: no gait deficits   10/10 Lumbopelvic mobility WNL  Elana's + on right       RESTRICTIONS/PRECAUTIONS:      Exercises/Interventions:     Therapeutic Exercises (48385) Resistance / level Sets/sec Reps Notes   Nustep/bike 5'    Hip hikes HEP   Standing hip isometric abd  HEP   Lateral walks  Monster walks/ zig zag Green  green 3  3 8  8    [facility]:   Leg Press  Leg Ext  Hamstring curl  Hip abduction  Row  Chest press  Free motion lat pull down  Stairs up to [facility] with no difficulty  HEP    Sidelying hip abd with ext iso against wall  1 10 B 10/17   Side plank Knees 20\" 2 B 10/17   Supine SLR with hip circles  2 10B  10/17                  Therapeutic Activities (35468)       HEP progression: Went to [facility] and showed pt different exercises to do on her own      Education on stretching to pain tolerance and not overstretching, education on activity modification to pain tolerance                            Neuromuscular Re-ed (12537)                                                 Manual Intervention (31918)       STM to right IT band, glute med, piriformis   Assess lumbopelvic region - WNL  5'                                             Modalities:     Pt. Education:  9/28/2022  -patient educated on diagnosis, prognosis and expectations for rehab  -all patient questions were answered    Home Exercise Program:  Access Code: [email or web address]  URL: [email or web address]  Date: 09/29/2022  Prepared by: Arabella Hi    Exercises  Supine Piriformis Stretch with Foot on Ground - 1 x daily - 7 x weekly - 1 sets - 3 reps - 20s hold  Supine ITB Stretch - 1 x daily - 7 x weekly - 1 sets - 3 reps - 20s hold    Access Code: [email or web address]  URL: [email or web address]  Date: 10/10/2022  Prepared by: Alexandrea Young    Exercises  Supine Piriformis Stretch with Foot on Ground - 1 x daily - 7 x weekly - 1 sets - 3 reps - 20s hold  Supine ITB Stretch - 1 x daily - 7 x weekly - 1 sets - 3 reps - 20s hold  Sidelying Hip Abduction - 1 x daily - 7 x weekly - 2 sets - 10 reps  Standing Hip Hiking - 1 x daily - 7 x weekly - 2 sets - 10 reps  Isometric Gluteus Medius at Wall - 2-3 x daily - 7 x weekly - 1 sets - 5 reps - 45-60 hold          Therapeutic Exercise and NMR EXR  [x] (34387) Provided verbal/tactile cueing for activities related to strengthening, flexibility, endurance, ROM for improvements in  [x] LE / Lumbar: LE, proximal hip, and core control with self care, mobility, lifting, ambulation. [] UE / Cervical: cervical, postural, scapular, scapulothoracic and UE control with self care, reaching, carrying, lifting, house/yardwork, driving, computer work.  [] (94664) Provided verbal/tactile cueing for activities related to improving balance, coordination, kinesthetic sense, posture, motor skill, proprioception to assist with   [] LE / lumbar: LE, proximal hip, and core control in self care, mobility, lifting, ambulation and eccentric single leg control. [] UE / cervical: cervical, scapular, scapulothoracic and UE control with self care, reaching, carrying, lifting, house/yardwork, driving, computer work.   [] (76479) Therapist is in constant attendance of 2 or more patients providing skilled therapy interventions, but not providing any significant amount of measurable one-on-one time to either patient, for improvements in  [] LE / lumbar: LE, proximal hip, and core control in self care, mobility, lifting, ambulation and eccentric single leg control. [] UE / cervical: cervical, scapular, scapulothoracic and UE control with self care, reaching, carrying, lifting, house/yardwork, driving, computer work.      NMR and Therapeutic Activities:    [x] (05191 or 20549) Provided verbal/tactile cueing for activities related to improving balance, coordination, kinesthetic sense, posture, motor skill, proprioception and motor activation to allow for proper function of   [] LE: / Lumbar core, proximal hip and LE with self care and ADLs  [] UE / Cervical: cervical, postural, scapular, scapulothoracic and UE control with self care, carrying, lifting, driving, computer work.   [] (61180) Gait Re-education- Provided training and instruction to the patient for proper LE, core and proximal hip recruitment and positioning and eccentric body weight control with ambulation re-education including up and down stairs     Home Management Training / Self Care:  [] (29502) Provided self-care/home management training related to activities of daily living and compensatory training, and/or use of adaptive equipment for improvement with: ADLs and compensatory training, meal preparation, safety procedures and instruction in use of adaptive equipment, including bathing, grooming, dressing, personal hygiene, basic household cleaning and chores. Home Exercise Program:    [x] (64443) Reviewed/Progressed HEP activities related to strengthening, flexibility, endurance, ROM of   [] LE / Lumbar: core, proximal hip and LE for functional self-care, mobility, lifting and ambulation/stair navigation   [] UE / Cervical: cervical, postural, scapular, scapulothoracic and UE control with self care, reaching, carrying, lifting, house/yardwork, driving, computer work  [] (22463)Reviewed/Progressed HEP activities related to improving balance, coordination, kinesthetic sense, posture, motor skill, proprioception of   [] LE: core, proximal hip and LE for self care, mobility, lifting, and ambulation/stair navigation    [] UE / Cervical: cervical, postural,  scapular, scapulothoracic and UE control with self care, reaching, carrying, lifting, house/yardwork, driving, computer work    Manual Treatments:  PROM / STM / Oscillations-Mobs:  G-I, II, III, IV (PA's, Inf., Post.)  [x] (63654) Provided manual therapy to mobilize LE, proximal hip and/or LS spine soft tissue/joints for the purpose of modulating pain, promoting relaxation,  increasing ROM, reducing/eliminating soft tissue swelling/inflammation/restriction, improving soft tissue extensibility and allowing for proper ROM for normal function with   0  LE / lumbar: self care, mobility, lifting and ambulation. [] UE / Cervical: self care, reaching, carrying, lifting, house/yardwork, driving, computer work.      Modalities:  [] (08248) Vasopneumatic compression: Utilized vasopneumatic compression to decrease edema / swelling for the purpose of improving mobility and quad tone / recruitment which will allow for increased overall function including but not limited to self-care, transfers, ambulation, and ascending / descending stairs. Charges:  Timed Code Treatment Minutes: 40   Total Treatment Minutes: 40     [] EVAL - LOW (99707)   [] EVAL - MOD (33996)  [] EVAL - HIGH (84467)  [] RE-EVAL (41094)  [x] HD(30840) x  3     [] Ionto  [] NMR (84105) x       [] Vaso  [] Manual (99951) x       [] Ultrasound  [] TA x      [] Mech Traction (11249)  [] Aquatic Therapy x     [] ES (un) (17521):   [] Home Management Training x  [] ES(attended) (83683)   [] Dry Needling 1-2 muscles (84653):  [] Dry Needling 3+ muscles (305036)  [] Group:      [] Other:     GOALS:    Patient stated goal: To improve   [] Progressing: [] Met: [] Not Met: [] Adjusted     Therapist goals for Patient:   Short Term Goals: To be achieved in: 2 weeks  1. Independent in HEP and progression per patient tolerance, in order to prevent re-injury. [] Progressing: [] Met: [] Not Met: [] Adjusted  2. Patient will have a decrease in pain to facilitate improvement in movement, function, and ADLs as indicated by Functional Deficits. [] Progressing: [] Met: [] Not Met: [] Adjusted     Long Term Goals: To be achieved in: 6 weeks  1. Patient to improve FOTO score of at least  69 to assist with reaching prior level of function. [] Progressing: [] Met: [] Not Met: [] Adjusted  2. Patient will demonstrate to prolong standing and walking> 20 min without pain . [] Progressing: [] Met: [] Not Met: [] Adjusted  3. Patient will demonstrate an increase in right hip Strength to +4/5 and core activation to allow for proper functional mobility as indicated by patients Functional Deficits. [] Progressing: [] Met: [] Not Met: [] Adjusted  4. Patient will return to functional activities including  participating in Pilates class without increased symptoms or restriction.    [] Progressing: [] Met: [] Not Met: [] Adjusted  5. Patient to be able to lie on the right hip without pain or increase symptoms. [] Progressing: [] Met: [] Not Met: [] Adjusted       Overall Progression Towards Functional goals/ Treatment Progress Update:  [] Patient is progressing as expected towards functional goals listed. [] Progression is slowed due to complexities/Impairments listed. [] Progression has been slowed due to co-morbidities. [x] Plan just implemented, too soon to assess goals progression <30days   [] Goals require adjustment due to lack of progress  [] Patient is not progressing as expected and requires additional follow up with physician  [] Other    Persisting Functional Limitations/Impairments:  []Sleeping []Sitting               []Standing []Transfers        [x]Walking []Kneeling               []Stairs []Squatting / bending   []ADLs []Reaching  []Lifting  []Housework  []Driving []Job related tasks  [x]Sports/Recreation []Other:        ASSESSMENT:  Pt progressing well with exercises pain free. Discussed D/C plans next visit if pain doesn't continue/worsen. Improving gait mechanics and hip strength. Pt will continue to benefit from skilled therapy to continue strengthening and return to PLOF pain free    Treatment/Activity Tolerance:  [x] Patient able to complete tx [] Patient limited by fatigue  [] Patient limited by pain  [] Patient limited by other medical complications  [] Other:     Prognosis: [x] Good [] Fair  [] Poor    Patient Requires Follow-up: [x] Yes  [] No    Plan for next treatment session:    PLAN: See eval. PT 2x / week for 6 weeks. [x] Continue per plan of care [] Alter current plan (see comments)  [] Plan of care initiated [] Hold pending MD visit [] Discharge    Electronically signed by: Leatha Ambrocio, PT PT, DPT  Therapist was present, directed the patient's care, made skilled judgement, and was responsible for assessment and treatment of the patient.    Amy Atkinson, SPT     Note: If patient does not return for scheduled/ recommended follow up visits, this note will serve as a discharge from care along with most recent update on progress.

## 2022-10-20 ENCOUNTER — APPOINTMENT (OUTPATIENT)
Dept: PHYSICAL THERAPY | Age: 67
End: 2022-10-20
Payer: MEDICARE

## 2022-10-24 ENCOUNTER — HOSPITAL ENCOUNTER (OUTPATIENT)
Dept: PHYSICAL THERAPY | Age: 67
Setting detail: THERAPIES SERIES
Discharge: HOME | End: 2022-10-24
Payer: MEDICARE

## 2022-10-24 PROCEDURE — 97110 THERAPEUTIC EXERCISES: CPT

## 2022-10-24 NOTE — FLOWSHEET NOTE
[address] [facility] Physical Therapy  Phone: [phone]   Fax: [phone]    Physical Therapy Daily Treatment Note/ Progress Note    Date:  10/24/2022     Patient Name:  Clarke Winter    :  1955  MRN: [id number]  Medical Diagnosis:  Degeneration of lumbar or lumbosacral intervertebral disc [M51.37]  Treatment Diagnosis:  Right inominate hypomobility, right ITB band trigger point tenderness                                      Insurance/Certification information:  PT Insurance Information: [facility] Medicare complete  Physician Information:  MADDISON Cowart *    Plan of care signed (Y/N): []  Yes [x]  No     Date of Patient follow up with Physician:      Progress Report: []  Yes  [x]  No     Date Range for reporting period:  Beginnin2022  Ending:     Progress report due (10 Rx/or 30 days whichever is less): visit #10 or  (date)     Recertification due (POC duration/ or 90 days whichever is less): visit # or  (date)     Visit # Insurance Allowable Auth required? Date Range     []  Yes  [x]  No        Latex Allergy:  [x]NO      []YES  Preferred Language for Healthcare:   [x]English       []other:    Functional Scale:           Date assessed:  FOTO physical FS primary measure score = 51; risk adjusted = 54  2022  FOTO physical FS primary measure score = 77;   Pain level: 0/10     SUBJECTIVE:  Patient reports that she continues to have improvement with her right discomfort. However, she still has difficulty lying on right side and sometimes ambulating up steps. Pt states she is doing a lot better, considering D/C on Monday. Went to [employer] and did exercises over the weekend. Wants to go to [employer] npv to make sure she is completing exercises right.      OBJECTIVE:     10/17: no gait deficits   10/10 Lumbopelvic mobility WNL  Elana's + on right       RESTRICTIONS/PRECAUTIONS:      Exercises/Interventions:     Therapeutic Exercises (68667) Resistance / level Sets/sec Reps Notes   Nustep/bike 5'    Hip hikes HEP   Standing hip isometric abd  HEP   Lateral walks  Monster walks/ zig zag Green  green 3  3 8  8    [facility]:   Leg Press  Leg Ext  Hamstring curl  Hip abduction  Row  Chest press  Free motion lat pull down  Stairs up to [facility] with no difficulty  HEP    Sidelying hip abd with ext iso against wall  1 10 B 10/17   Side plank Knees 20\" 2 B 10/17   Supine SLR with hip circles  2 10B  10/17    Bridging w/ knee ext  1 10 B 10/24          Therapeutic Activities (92702)       HEP progression: Went to [facility] and showed pt different exercises to do on her own      Education on stretching to pain tolerance and not overstretching, education on activity modification to pain tolerance       Assessment  5 min                    Neuromuscular Re-ed (54533)                                                 Manual Intervention (70399)       STM to right IT band, glute med, piriformis   Assess lumbopelvic region - WNL                                             Modalities:     Pt. Education:  9/28/2022  -patient educated on diagnosis, prognosis and expectations for rehab  -all patient questions were answered    Home Exercise Program:  Access Code: [email or web address]  URL: [email or web address]  Date: 09/29/2022  Prepared by: Melinda Foot    Exercises  Supine Piriformis Stretch with Foot on Ground - 1 x daily - 7 x weekly - 1 sets - 3 reps - 20s hold  Supine ITB Stretch - 1 x daily - 7 x weekly - 1 sets - 3 reps - 20s hold    Access Code: [email or web address]  URL: [email or web address]  Date: 10/10/2022  Prepared by: Mary Paula Huml    Exercises  Supine Piriformis Stretch with Foot on Ground - 1 x daily - 7 x weekly - 1 sets - 3 reps - 20s hold  Supine ITB Stretch - 1 x daily - 7 x weekly - 1 sets - 3 reps - 20s hold  Sidelying Hip Abduction - 1 x daily - 7 x weekly - 2 sets - 10 reps  Standing Hip Hiking - 1 x daily - 7 x weekly - 2 sets - 10 reps  Isometric Gluteus Medius at [address] - 2-3 x daily - 7 x weekly - 1 sets - 5 reps - 45-60 hold          Therapeutic Exercise and NMR EXR  [x] (95883) Provided verbal/tactile cueing for activities related to strengthening, flexibility, endurance, ROM for improvements in  [x] LE / Lumbar: LE, proximal hip, and core control with self care, mobility, lifting, ambulation. [] UE / Cervical: cervical, postural, scapular, scapulothoracic and UE control with self care, reaching, carrying, lifting, house/yardwork, driving, computer work.  [] (11536) Provided verbal/tactile cueing for activities related to improving balance, coordination, kinesthetic sense, posture, motor skill, proprioception to assist with   [] LE / lumbar: LE, proximal hip, and core control in self care, mobility, lifting, ambulation and eccentric single leg control. [] UE / cervical: cervical, scapular, scapulothoracic and UE control with self care, reaching, carrying, lifting, house/yardwork, driving, computer work.   [] (63042) Therapist is in constant attendance of 2 or more patients providing skilled therapy interventions, but not providing any significant amount of measurable one-on-one time to either patient, for improvements in  [] LE / lumbar: LE, proximal hip, and core control in self care, mobility, lifting, ambulation and eccentric single leg control. [] UE / cervical: cervical, scapular, scapulothoracic and UE control with self care, reaching, carrying, lifting, house/yardwork, driving, computer work. NMR and Therapeutic Activities:    [x] (53809 or 17271) Provided verbal/tactile cueing for activities related to improving balance, coordination, kinesthetic sense, posture, motor skill, proprioception and motor activation to allow for proper function of   [] LE: / Lumbar core, proximal hip and LE with self care and ADLs  [] UE / Cervical: cervical, postural, scapular, scapulothoracic and UE control with self care, carrying, lifting, driving, computer work.    [] (60465) Gait Re-education- Provided training and instruction to the patient for proper LE, core and proximal hip recruitment and positioning and eccentric body weight control with ambulation re-education including up and down stairs     Home Management Training / Self Care:  [] (46014) Provided self-care/home management training related to activities of daily living and compensatory training, and/or use of adaptive equipment for improvement with: ADLs and compensatory training, meal preparation, safety procedures and instruction in use of adaptive equipment, including bathing, grooming, dressing, personal hygiene, basic household cleaning and chores.      Home Exercise Program:    [x] (33989) Reviewed/Progressed HEP activities related to strengthening, flexibility, endurance, ROM of   [] LE / Lumbar: core, proximal hip and LE for functional self-care, mobility, lifting and ambulation/stair navigation   [] UE / Cervical: cervical, postural, scapular, scapulothoracic and UE control with self care, reaching, carrying, lifting, house/yardwork, driving, computer work  [] (84953)Reviewed/Progressed HEP activities related to improving balance, coordination, kinesthetic sense, posture, motor skill, proprioception of   [] LE: core, proximal hip and LE for self care, mobility, lifting, and ambulation/stair navigation    [] UE / Cervical: cervical, postural,  scapular, scapulothoracic and UE control with self care, reaching, carrying, lifting, house/yardwork, driving, computer work    Manual Treatments:  PROM / STM / Oscillations-Mobs:  G-I, II, III, IV (PA's, Inf., Post.)  [x] (91985) Provided manual therapy to mobilize LE, proximal hip and/or LS spine soft tissue/joints for the purpose of modulating pain, promoting relaxation,  increasing ROM, reducing/eliminating soft tissue swelling/inflammation/restriction, improving soft tissue extensibility and allowing for proper ROM for normal function with   0  LE / lumbar: self care, mobility, lifting and ambulation. [] UE / Cervical: self care, reaching, carrying, lifting, house/yardwork, driving, computer work. Modalities:  [] (27650) Vasopneumatic compression: Utilized vasopneumatic compression to decrease edema / swelling for the purpose of improving mobility and quad tone / recruitment which will allow for increased overall function including but not limited to self-care, transfers, ambulation, and ascending / descending stairs. Charges:  Timed Code Treatment Minutes: 38   Total Treatment Minutes: 38     [] EVAL - LOW (49912)   [] EVAL - MOD (43463)  [] EVAL - HIGH (53952)  [] RE-EVAL (60480)  [x] VY(80058) x  3     [] Ionto  [] NMR (01671) x       [] Vaso  [] Manual (87614) x       [] Ultrasound  [] TA x      [] Mech Traction (93832)  [] Aquatic Therapy x     [] ES (un) (61786):   [] Home Management Training x  [] ES(attended) (09564)   [] Dry Needling 1-2 muscles (00752):  [] Dry Needling 3+ muscles (834398)  [] Group:      [] Other:     GOALS:    Patient stated goal: To improve   [] Progressing: [] Met: [] Not Met: [] Adjusted     Therapist goals for Patient:   Short Term Goals: To be achieved in: 2 weeks  1. Independent in HEP and progression per patient tolerance, in order to prevent re-injury. [] Progressing: [x] Met: [] Not Met: [] Adjusted  2. Patient will have a decrease in pain to facilitate improvement in movement, function, and ADLs as indicated by Functional Deficits. [] Progressing: [x] Met: [] Not Met: [] Adjusted     Long Term Goals: To be achieved in: 6 weeks  1. Patient to improve FOTO score of at least  69 to assist with reaching prior level of function. [] Progressing: [x] Met: [] Not Met: [] Adjusted  2. Patient will demonstrate to prolong standing and walking> 20 min without pain . [x] Progressing: [] Met: [] Not Met: [] Adjusted  3.  Patient will demonstrate an increase in right hip Strength to +4/5 and core activation to allow for proper functional mobility as indicated by patients Functional Deficits. [x] Progressing: [] Met: [] Not Met: [] Adjusted  4. Patient will return to functional activities including  participating in Pilates class without increased symptoms or restriction. [x] Progressing: [] Met: [] Not Met: [] Adjusted  5. Patient to be able to lie on the right hip without pain or increase symptoms. [x] Progressing: [] Met: [] Not Met: [] Adjusted       Overall Progression Towards Functional goals/ Treatment Progress Update:  [] Patient is progressing as expected towards functional goals listed. [] Progression is slowed due to complexities/Impairments listed. [] Progression has been slowed due to co-morbidities. [x] Plan just implemented, too soon to assess goals progression <30days   [] Goals require adjustment due to lack of progress  [] Patient is not progressing as expected and requires additional follow up with physician  [] Other    Persisting Functional Limitations/Impairments:  []Sleeping []Sitting               []Standing []Transfers        [x]Walking []Kneeling               []Stairs []Squatting / bending   []ADLs []Reaching  []Lifting  []Housework  []Driving []Job related tasks  [x]Sports/Recreation []Other:        ASSESSMENT:  Patient expressed desire to continue PT for more weeks . Added bridging with alternating knee extension for glut med and proximal hip strengthening. She says having a few more visits monitoring her progress would be helpful. Pt encouraged to began performing exercises in [facility]. Will continue to progress proximal hip and core training as tolerated. Pt progressing well with exercises pain free. Discussed D/C plans next visit if pain doesn't continue/worsen. Improving gait mechanics and hip strength.  Pt will continue to benefit from skilled therapy to continue strengthening and return to PLOF pain free    Treatment/Activity Tolerance:  [x] Patient able to complete tx [] Patient limited by fatigue  [] Patient limited by pain  [] Patient limited by other medical complications  [] Other:     Prognosis: [x] Good [] Fair  [] Poor    Patient Requires Follow-up: [x] Yes  [] No    Plan for next treatment session:    PLAN: See eval. PT 2x / week for 6 weeks. [x] Continue per plan of care [] Alter current plan (see comments)  [] Plan of care initiated [] Hold pending MD visit [] Discharge    Electronically signed by: Jovani Pickens PT PT, DPT  Therapist was present, directed the patient's care, made skilled judgement, and was responsible for assessment and treatment of the patient. Diya Faulkner, SPT     Note: If patient does not return for scheduled/ recommended follow up visits, this note will serve as a discharge from care along with most recent update on progress.

## 2022-10-27 ENCOUNTER — HOSPITAL ENCOUNTER (OUTPATIENT)
Dept: PHYSICAL THERAPY | Age: 67
Setting detail: THERAPIES SERIES
Discharge: HOME | End: 2022-10-27
Payer: MEDICARE

## 2022-10-27 PROCEDURE — 97140 MANUAL THERAPY 1/> REGIONS: CPT

## 2022-10-27 PROCEDURE — 97110 THERAPEUTIC EXERCISES: CPT

## 2022-10-27 NOTE — FLOWSHEET NOTE
[facility]  Phone: [phone]   Fax: [phone]    Physical Therapy Daily Treatment Note/ Progress Note    Date:  10/27/2022     Patient Name:  Demarco Evans    :  1955  MRN: [id number]  Medical Diagnosis:  Degeneration of lumbar or lumbosacral intervertebral disc [M51.37]  Treatment Diagnosis:  Right inominate hypomobility, right ITB band trigger point tenderness                                      Insurance/Certification information:  PT Insurance Information: [facility] Medicare complete  Physician Information:  MADDISON Britt *    Plan of care signed (Y/N): []  Yes [x]  No     Date of Patient follow up with Physician:      Progress Report: []  Yes  [x]  No     Date Range for reporting period:  Beginnin2022  Ending:     Progress report due (10 Rx/or 30 days whichever is less): visit #10 or  (date)     Recertification due (POC duration/ or 90 days whichever is less): visit # or  (date)     Visit # Insurance Allowable Auth required? Date Range     []  Yes  [x]  No        Latex Allergy:  [x]NO      []YES  Preferred Language for Healthcare:   [x]English       []other:    Functional Scale:           Date assessed:  FOTO physical FS primary measure score = 51; risk adjusted = 54  2022  FOTO physical FS primary measure score = 77;   Pain level: 0/10     SUBJECTIVE:  Patient reports that she continues to have improvement with her right discomfort. However, she still has difficulty lying on right side and sometimes ambulating up steps. Pt states she is doing a lot better, considering D/C on Monday. Went to [employer] and did exercises over the weekend. Wants to go to [employer] npv to make sure she is completing exercises right.      OBJECTIVE:     10/17: no gait deficits   10/10 Lumbopelvic mobility WNL  Elana's + on right       RESTRICTIONS/PRECAUTIONS:      Exercises/Interventions:     Therapeutic Exercises (39567) Resistance / level Sets/sec Reps Notes   Nustep/bike 5'    Hip hikes HEP   Standing ITB stretch     Lateral walks  Monster walks/ alysa osorio Green  green 3  3 8  8    [facility]:   Leg Press  Leg Ext  Hamstring curl  Hip abduction  Row  Chest press  Free motion lat pull down  Stairs up to [facility] with no difficulty  HEP    Sidelying hip abd with ext iso against wall  1 10 B 10/17   Side plank Knees 20\" 2 B 10/17   Supine SLR with hip circles  2 10B  10/17    Bridging w/ knee ext  2 10 B 10/27 increase reps   TG Squats w/ t-band for   SL Squat  Lime  15  10 10/27 emphasis glut med    Therapeutic Activities (62869)       HEP progression: Went to [facility] and showed pt different exercises to do on her own      Education on stretching to pain tolerance and not overstretching, education on activity modification to pain tolerance       Assessment                     Neuromuscular Re-ed (02522)                                                 Manual Intervention (49941)       STM to right IT band, glute med, piriformis   Assess lumbopelvic region - WNL                                             Modalities:     Pt. Education:  9/28/2022  -patient educated on diagnosis, prognosis and expectations for rehab  -all patient questions were answered    Home Exercise Program:  Access Code: [email or web address]  URL: [email or web address]  Date: 09/29/2022  Prepared by: Geronimo Silva    Exercises  Supine Piriformis Stretch with Foot on Ground - 1 x daily - 7 x weekly - 1 sets - 3 reps - 20s hold  Supine ITB Stretch - 1 x daily - 7 x weekly - 1 sets - 3 reps - 20s hold    Access Code: [email or web address]  URL: [email or web address]  Date: 10/10/2022  Prepared by: Alejandro Young    Exercises  Supine Piriformis Stretch with Foot on Ground - 1 x daily - 7 x weekly - 1 sets - 3 reps - 20s hold  Supine ITB Stretch - 1 x daily - 7 x weekly - 1 sets - 3 reps - 20s hold  Sidelying Hip Abduction - 1 x daily - 7 x weekly - 2 sets - 10 reps  Standing Hip Hiking - 1 x daily - 7 x weekly - 2 sets - 10 reps  Isometric Gluteus Medius at Wall - 2-3 x daily - 7 x weekly - 1 sets - 5 reps - 45-60 hold          Therapeutic Exercise and NMR EXR  [x] (89580) Provided verbal/tactile cueing for activities related to strengthening, flexibility, endurance, ROM for improvements in  [x] LE / Lumbar: LE, proximal hip, and core control with self care, mobility, lifting, ambulation. [] UE / Cervical: cervical, postural, scapular, scapulothoracic and UE control with self care, reaching, carrying, lifting, house/yardwork, driving, computer work.  [] (80404) Provided verbal/tactile cueing for activities related to improving balance, coordination, kinesthetic sense, posture, motor skill, proprioception to assist with   [] LE / lumbar: LE, proximal hip, and core control in self care, mobility, lifting, ambulation and eccentric single leg control. [] UE / cervical: cervical, scapular, scapulothoracic and UE control with self care, reaching, carrying, lifting, house/yardwork, driving, computer work.   [] (60859) Therapist is in constant attendance of 2 or more patients providing skilled therapy interventions, but not providing any significant amount of measurable one-on-one time to either patient, for improvements in  [] LE / lumbar: LE, proximal hip, and core control in self care, mobility, lifting, ambulation and eccentric single leg control. [] UE / cervical: cervical, scapular, scapulothoracic and UE control with self care, reaching, carrying, lifting, house/yardwork, driving, computer work.      NMR and Therapeutic Activities:    [x] (23666 or 14766) Provided verbal/tactile cueing for activities related to improving balance, coordination, kinesthetic sense, posture, motor skill, proprioception and motor activation to allow for proper function of   [] LE: / Lumbar core, proximal hip and LE with self care and ADLs  [] UE / Cervical: cervical, postural, scapular, scapulothoracic and UE control with self care, carrying, lifting, driving, computer work.   [] (72476) Gait Re-education- Provided training and instruction to the patient for proper LE, core and proximal hip recruitment and positioning and eccentric body weight control with ambulation re-education including up and down stairs     Home Management Training / Self Care:  [] (97167) Provided self-care/home management training related to activities of daily living and compensatory training, and/or use of adaptive equipment for improvement with: ADLs and compensatory training, meal preparation, safety procedures and instruction in use of adaptive equipment, including bathing, grooming, dressing, personal hygiene, basic household cleaning and chores.      Home Exercise Program:    [x] (11032) Reviewed/Progressed HEP activities related to strengthening, flexibility, endurance, ROM of   [] LE / Lumbar: core, proximal hip and LE for functional self-care, mobility, lifting and ambulation/stair navigation   [] UE / Cervical: cervical, postural, scapular, scapulothoracic and UE control with self care, reaching, carrying, lifting, house/yardwork, driving, computer work  [] (36910)Reviewed/Progressed HEP activities related to improving balance, coordination, kinesthetic sense, posture, motor skill, proprioception of   [] LE: core, proximal hip and LE for self care, mobility, lifting, and ambulation/stair navigation    [] UE / Cervical: cervical, postural,  scapular, scapulothoracic and UE control with self care, reaching, carrying, lifting, house/yardwork, driving, computer work    Manual Treatments:  PROM / STM / Oscillations-Mobs:  G-I, II, III, IV (PA's, Inf., Post.)  [x] (00843) Provided manual therapy to mobilize LE, proximal hip and/or LS spine soft tissue/joints for the purpose of modulating pain, promoting relaxation,  increasing ROM, reducing/eliminating soft tissue swelling/inflammation/restriction, improving soft tissue extensibility and allowing for proper ROM for normal function with   0  LE / lumbar: self care, mobility, lifting and ambulation. [] UE / Cervical: self care, reaching, carrying, lifting, house/yardwork, driving, computer work. Modalities:  [] (50858) Vasopneumatic compression: Utilized vasopneumatic compression to decrease edema / swelling for the purpose of improving mobility and quad tone / recruitment which will allow for increased overall function including but not limited to self-care, transfers, ambulation, and ascending / descending stairs. Charges:  Timed Code Treatment Minutes: 47   Total Treatment Minutes: 47     [] EVAL - LOW (14065)   [] EVAL - MOD (34641)  [] EVAL - HIGH (93400)  [] RE-EVAL (53901)  [x] SJ(04753) x  2     [] Ionto  [] NMR (39386) x       [] Vaso  [x] Manual (39781) x 1      [] Ultrasound  [] TA x      [] Mech Traction (49566)  [] Aquatic Therapy x     [] ES (un) (93080):   [] Home Management Training x  [] ES(attended) (68266)   [] Dry Needling 1-2 muscles (68540):  [] Dry Needling 3+ muscles (037358)  [] Group:      [] Other:     GOALS:    Patient stated goal: To improve   [] Progressing: [] Met: [] Not Met: [] Adjusted     Therapist goals for Patient:   Short Term Goals: To be achieved in: 2 weeks  1. Independent in HEP and progression per patient tolerance, in order to prevent re-injury. [] Progressing: [x] Met: [] Not Met: [] Adjusted  2. Patient will have a decrease in pain to facilitate improvement in movement, function, and ADLs as indicated by Functional Deficits. [] Progressing: [x] Met: [] Not Met: [] Adjusted     Long Term Goals: To be achieved in: 6 weeks  1. Patient to improve FOTO score of at least  69 to assist with reaching prior level of function. [] Progressing: [x] Met: [] Not Met: [] Adjusted  2. Patient will demonstrate to prolong standing and walking> 20 min without pain . [x] Progressing: [] Met: [] Not Met: [] Adjusted  3.  Patient will demonstrate an increase in right hip Strength to +4/5 and core activation to allow for proper functional mobility as indicated by patients Functional Deficits. [x] Progressing: [] Met: [] Not Met: [] Adjusted  4. Patient will return to functional activities including  participating in Pilates class without increased symptoms or restriction. [x] Progressing: [] Met: [] Not Met: [] Adjusted  5. Patient to be able to lie on the right hip without pain or increase symptoms. [x] Progressing: [] Met: [] Not Met: [] Adjusted       Overall Progression Towards Functional goals/ Treatment Progress Update:  [] Patient is progressing as expected towards functional goals listed. [] Progression is slowed due to complexities/Impairments listed. [] Progression has been slowed due to co-morbidities. [x] Plan just implemented, too soon to assess goals progression <30days   [] Goals require adjustment due to lack of progress  [] Patient is not progressing as expected and requires additional follow up with physician  [] Other    Persisting Functional Limitations/Impairments:  []Sleeping []Sitting               []Standing []Transfers        [x]Walking []Kneeling               []Stairs []Squatting / bending   []ADLs []Reaching  []Lifting  []Housework  []Driving []Job related tasks  [x]Sports/Recreation []Other:        ASSESSMENT:  Pt continued with hip strengthening. Added TG and ITB stretching in bold above. Pt performed and executed  hip strengthening exercises with minimal fatigue. Emphasis was activation glut med and single isolation . Patient received manual intervention STM to right IT band . She demonstrated tenderness over right ITB muscle belly during manual. Discussed and educated pt on incorporating  more stretching . Patient expressed desire to continue PT for more weeks . Added bridging with alternating knee extension for glut med and proximal hip strengthening. She says having a few more visits monitoring her progress would be helpful.   Pt encouraged to began performing exercises in [facility]. Will continue to progress proximal hip and core training as tolerated. Pt progressing well with exercises pain free. Discussed D/C plans next visit if pain doesn't continue/worsen. Improving gait mechanics and hip strength. Pt will continue to benefit from skilled therapy to continue strengthening and return to PLOF pain free    Treatment/Activity Tolerance:  [x] Patient able to complete tx [] Patient limited by fatigue  [] Patient limited by pain  [] Patient limited by other medical complications  [] Other:     Prognosis: [x] Good [] Fair  [] Poor    Patient Requires Follow-up: [x] Yes  [] No    Plan for next treatment session:    PLAN: See eval. PT 2x / week for 6 weeks. [x] Continue per plan of care [] Alter current plan (see comments)  [] Plan of care initiated [] Hold pending MD visit [] Discharge    Electronically signed by: Hailee Waterman, PT PT, DPT  Therapist was present, directed the patient's care, made skilled judgement, and was responsible for assessment and treatment of the patient. Sharolyn Goldmann, SPT     Note: If patient does not return for scheduled/ recommended follow up visits, this note will serve as a discharge from care along with most recent update on progress.

## 2022-10-31 ENCOUNTER — HOSPITAL ENCOUNTER (OUTPATIENT)
Dept: PHYSICAL THERAPY | Age: 67
Setting detail: THERAPIES SERIES
Discharge: HOME | End: 2022-10-31
Payer: MEDICARE

## 2022-10-31 PROCEDURE — 97110 THERAPEUTIC EXERCISES: CPT

## 2022-10-31 NOTE — FLOWSHEET NOTE
[address] [facility]  Phone: [phone]   Fax: [phone]    Physical Therapy Daily Treatment Note/ Progress Note    Date:  10/31/2022     Patient Name:  Jose Waite    :  1955  MRN: [id number]  Medical Diagnosis:  Degeneration of lumbar or lumbosacral intervertebral disc [M51.37]  Treatment Diagnosis:  Right inominate hypomobility, right ITB band trigger point tenderness                                      Insurance/Certification information:  PT Insurance Information: [facility] Medicare complete  Physician Information:  MADDISON Hickman    Plan of care signed (Y/N): [x]  Yes []  No     Date of Patient follow up with Physician:      Progress Report: []  Yes  [x]  No     Date Range for reporting period:  Beginnin2022  Ending:     Progress report due (10 Rx/or 30 days whichever is less): visit #10 or  (date)     Recertification due (POC duration/ or 90 days whichever is less): visit # or  (date)     Visit # Insurance Allowable Auth required? Date Range     []  Yes  [x]  No        Latex Allergy:  [x]NO      []YES  Preferred Language for Healthcare:   [x]English       []other:    Functional Scale:           Date assessed:  FOTO physical FS primary measure score = 51; risk adjusted = 54  2022  FOTO physical FS primary measure score = 77;     Pain level: 0/10     SUBJECTIVE:  Pt reports she has been doing well. Did piliates over the weekend and felt good during. Pt agreeable to 2 week follow up after this visit. States she feels pretty good with [facility] exercises.      OBJECTIVE:     10/17: no gait deficits   10/10 Lumbopelvic mobility WNL  Elana's + on right       RESTRICTIONS/PRECAUTIONS:      Exercises/Interventions:     Therapeutic Exercises (47670) Resistance / level Sets/sec Reps Notes   Nustep/bike 5'    Hip hikes HEP   Standing ITB stretch     Lateral walks  Monster walks/ zig zag Green  green    [facility]:   Leg Press  Leg Ext  Hamstring curl  Hip abduction  Row  Chest press  Free motion lat pull down  Stairs up to [facility] with no difficulty    100-120lbs  40 lbs  40 lbs  20 lbs  30 lbs  30 lbs  30 lbs     ~2x12 for all      HEP ~35 min; provided road to wellness pass   Sidelying hip abd with ext iso against wall  10/17   Side plank Knees 10/17   Supine SLR with hip circles  10/17    Bridging w/ knee ext  10/27 increase reps   TG Squats w/ t-band for   SL Squat  Lime 10/27 emphasis glut med    Therapeutic Activities (96451)       HEP progression: Went to [facility] and showed pt different exercises to do on her own      Education on stretching to pain tolerance and not overstretching, education on activity modification to pain tolerance       Assessment                     Neuromuscular Re-ed (81915)                                                 Manual Intervention (95879)       STM to right IT band, glute med, piriformis   Assess lumbopelvic region - WNL                                             Modalities:     Pt. Education:  9/28/2022  -patient educated on diagnosis, prognosis and expectations for rehab  -all patient questions were answered    Home Exercise Program:  Access Code: [email or web address]  URL: [email or web address]  Date: 09/29/2022  Prepared by: Shyanne Farias    Exercises  Supine Piriformis Stretch with Foot on Ground - 1 x daily - 7 x weekly - 1 sets - 3 reps - 20s hold  Supine ITB Stretch - 1 x daily - 7 x weekly - 1 sets - 3 reps - 20s hold    Access Code: [email or web address]  URL: [email or web address]  Date: 10/10/2022  Prepared by: Choco Young    Exercises  Supine Piriformis Stretch with Foot on Ground - 1 x daily - 7 x weekly - 1 sets - 3 reps - 20s hold  Supine ITB Stretch - 1 x daily - 7 x weekly - 1 sets - 3 reps - 20s hold  Sidelying Hip Abduction - 1 x daily - 7 x weekly - 2 sets - 10 reps  Standing Hip Hiking - 1 x daily - 7 x weekly - 2 sets - 10 reps  Isometric Gluteus Medius at Wall - 2-3 x daily - 7 x Provided training and instruction to the patient for proper LE, core and proximal hip recruitment and positioning and eccentric body weight control with ambulation re-education including up and down stairs     Home Management Training / Self Care:  [] (95894) Provided self-care/home management training related to activities of daily living and compensatory training, and/or use of adaptive equipment for improvement with: ADLs and compensatory training, meal preparation, safety procedures and instruction in use of adaptive equipment, including bathing, grooming, dressing, personal hygiene, basic household cleaning and chores.      Home Exercise Program:    [x] (74314) Reviewed/Progressed HEP activities related to strengthening, flexibility, endurance, ROM of   [] LE / Lumbar: core, proximal hip and LE for functional self-care, mobility, lifting and ambulation/stair navigation   [] UE / Cervical: cervical, postural, scapular, scapulothoracic and UE control with self care, reaching, carrying, lifting, house/yardwork, driving, computer work  [] (89297)Reviewed/Progressed HEP activities related to improving balance, coordination, kinesthetic sense, posture, motor skill, proprioception of   [] LE: core, proximal hip and LE for self care, mobility, lifting, and ambulation/stair navigation    [] UE / Cervical: cervical, postural,  scapular, scapulothoracic and UE control with self care, reaching, carrying, lifting, house/yardwork, driving, computer work    Manual Treatments:  PROM / STM / Oscillations-Mobs:  G-I, II, III, IV (PA's, Inf., Post.)  [] (67802) Provided manual therapy to mobilize LE, proximal hip and/or LS spine soft tissue/joints for the purpose of modulating pain, promoting relaxation,  increasing ROM, reducing/eliminating soft tissue swelling/inflammation/restriction, improving soft tissue extensibility and allowing for proper ROM for normal function with   0  LE / lumbar: self care, mobility, lifting and ambulation. [] UE / Cervical: self care, reaching, carrying, lifting, house/yardwork, driving, computer work. Modalities:  [] (66972) Vasopneumatic compression: Utilized vasopneumatic compression to decrease edema / swelling for the purpose of improving mobility and quad tone / recruitment which will allow for increased overall function including but not limited to self-care, transfers, ambulation, and ascending / descending stairs. Charges:  Timed Code Treatment Minutes: 45   Total Treatment Minutes: 45     [] EVAL - LOW (25163)   [] EVAL - MOD (37547)  [] EVAL - HIGH (62441)  [] RE-EVAL (56107)  [x] PT(82948) x  3     [] Ionto  [] NMR (61230) x       [] Vaso  [] Manual (79442) x 1      [] Ultrasound  [] TA x      [] Mech Traction (26393)  [] Aquatic Therapy x     [] ES (un) (73014):   [] Home Management Training x  [] ES(attended) (18744)   [] Dry Needling 1-2 muscles (70861):  [] Dry Needling 3+ muscles (904928)  [] Group:      [] Other:     GOALS:    Patient stated goal: To improve   [] Progressing: [] Met: [] Not Met: [] Adjusted     Therapist goals for Patient:   Short Term Goals: To be achieved in: 2 weeks  1. Independent in HEP and progression per patient tolerance, in order to prevent re-injury. [] Progressing: [x] Met: [] Not Met: [] Adjusted  2. Patient will have a decrease in pain to facilitate improvement in movement, function, and ADLs as indicated by Functional Deficits. [] Progressing: [x] Met: [] Not Met: [] Adjusted     Long Term Goals: To be achieved in: 6 weeks  1. Patient to improve FOTO score of at least  69 to assist with reaching prior level of function. [] Progressing: [x] Met: [] Not Met: [] Adjusted  2. Patient will demonstrate to prolong standing and walking> 20 min without pain . [x] Progressing: [] Met: [] Not Met: [] Adjusted  3.  Patient will demonstrate an increase in right hip Strength to +4/5 and core activation to allow for proper functional mobility as indicated by patients Functional Deficits. [x] Progressing: [] Met: [] Not Met: [] Adjusted  4. Patient will return to functional activities including  participating in Pilates class without increased symptoms or restriction. [x] Progressing: [] Met: [] Not Met: [] Adjusted  5. Patient to be able to lie on the right hip without pain or increase symptoms. [x] Progressing: [] Met: [] Not Met: [] Adjusted       Overall Progression Towards Functional goals/ Treatment Progress Update:  [x] Patient is progressing as expected towards functional goals listed. [] Progression is slowed due to complexities/Impairments listed. [] Progression has been slowed due to co-morbidities. [] Plan just implemented, too soon to assess goals progression <30days   [] Goals require adjustment due to lack of progress  [] Patient is not progressing as expected and requires additional follow up with physician  [] Other    Persisting Functional Limitations/Impairments:  []Sleeping []Sitting               []Standing []Transfers        [x]Walking []Kneeling               []Stairs []Squatting / bending   []ADLs []Reaching  []Lifting  []Housework  []Driving []Job related tasks  [x]Sports/Recreation []Other:        ASSESSMENT:  [facility] continued to progress with strengthening exercises and [facility] No pain at this date, just soreness/stiffness. She will have a 2 week follow up to address any further needs. Planing d/c npv. Treatment/Activity Tolerance:  [x] Patient able to complete tx [] Patient limited by fatigue  [] Patient limited by pain  [] Patient limited by other medical complications  [] Other:     Prognosis: [x] Good [] Fair  [] Poor    Patient Requires Follow-up: [x] Yes  [] No    Plan for next treatment session:    PLAN: See eval. PT 2x / week for 6 weeks.    [x] Continue per plan of care [] Alter current plan (see comments)  [] Plan of care initiated [] Hold pending MD visit [] Discharge    Electronically signed by: Amy Islas, PT PT, DPT  Therapist was present, directed the patient's care, made skilled judgement, and was responsible for assessment and treatment of the patient. Gita Golden, [facility]     Note: If patient does not return for scheduled/ recommended follow up visits, this note will serve as a discharge from care along with most recent update on progress.

## 2022-11-18 DIAGNOSIS — E03.9 ACQUIRED HYPOTHYROIDISM: ICD-10-CM

## 2022-11-18 LAB
T4 FREE: 1.3 NG/DL (ref 0.9–1.8)
TSH SERPL DL<=0.05 MIU/L-ACNC: 5.53 UIU/ML (ref 0.27–4.2)

## 2022-11-23 ENCOUNTER — OFFICE VISIT (OUTPATIENT)
Dept: INTERNAL MEDICINE CLINIC | Age: 67
End: 2022-11-23
Payer: MEDICARE

## 2022-11-23 VITALS
DIASTOLIC BLOOD PRESSURE: 70 MMHG | WEIGHT: 138.8 LBS | HEART RATE: 50 BPM | SYSTOLIC BLOOD PRESSURE: 110 MMHG | RESPIRATION RATE: 24 BRPM | BODY MASS INDEX: 23.7 KG/M2 | OXYGEN SATURATION: 99 % | HEIGHT: 64 IN

## 2022-11-23 DIAGNOSIS — M25.542 ARTHRALGIA OF LEFT HAND: ICD-10-CM

## 2022-11-23 DIAGNOSIS — M70.61 TROCHANTERIC BURSITIS OF RIGHT HIP: ICD-10-CM

## 2022-11-23 DIAGNOSIS — M51.37 DEGENERATION OF LUMBAR OR LUMBOSACRAL INTERVERTEBRAL DISC: ICD-10-CM

## 2022-11-23 DIAGNOSIS — M47.812 SPONDYLOSIS OF CERVICAL REGION WITHOUT MYELOPATHY OR RADICULOPATHY: ICD-10-CM

## 2022-11-23 DIAGNOSIS — Z00.00 MEDICARE ANNUAL WELLNESS VISIT, SUBSEQUENT: Primary | ICD-10-CM

## 2022-11-23 DIAGNOSIS — Z23 FLU VACCINE NEED: ICD-10-CM

## 2022-11-23 DIAGNOSIS — E03.9 ACQUIRED HYPOTHYROIDISM: ICD-10-CM

## 2022-11-23 PROBLEM — M70.62 TROCHANTERIC BURSITIS OF LEFT HIP: Status: ACTIVE | Noted: 2022-11-23

## 2022-11-23 PROCEDURE — G0008 ADMIN INFLUENZA VIRUS VAC: HCPCS | Performed by: INTERNAL MEDICINE

## 2022-11-23 PROCEDURE — G8400 PT W/DXA NO RESULTS DOC: HCPCS | Performed by: INTERNAL MEDICINE

## 2022-11-23 PROCEDURE — 1090F PRES/ABSN URINE INCON ASSESS: CPT | Performed by: INTERNAL MEDICINE

## 2022-11-23 PROCEDURE — G8420 CALC BMI NORM PARAMETERS: HCPCS | Performed by: INTERNAL MEDICINE

## 2022-11-23 PROCEDURE — G0439 PPPS, SUBSEQ VISIT: HCPCS | Performed by: INTERNAL MEDICINE

## 2022-11-23 PROCEDURE — 90694 VACC AIIV4 NO PRSRV 0.5ML IM: CPT | Performed by: INTERNAL MEDICINE

## 2022-11-23 PROCEDURE — G8484 FLU IMMUNIZE NO ADMIN: HCPCS | Performed by: INTERNAL MEDICINE

## 2022-11-23 PROCEDURE — 1123F ACP DISCUSS/DSCN MKR DOCD: CPT | Performed by: INTERNAL MEDICINE

## 2022-11-23 PROCEDURE — 99213 OFFICE O/P EST LOW 20 MIN: CPT | Performed by: INTERNAL MEDICINE

## 2022-11-23 PROCEDURE — 3017F COLORECTAL CA SCREEN DOC REV: CPT | Performed by: INTERNAL MEDICINE

## 2022-11-23 PROCEDURE — G8427 DOCREV CUR MEDS BY ELIG CLIN: HCPCS | Performed by: INTERNAL MEDICINE

## 2022-11-23 PROCEDURE — 1036F TOBACCO NON-USER: CPT | Performed by: INTERNAL MEDICINE

## 2022-11-23 RX ORDER — METHYLPREDNISOLONE 4 MG/1
TABLET ORAL
Qty: 1 KIT | Refills: 0 | Status: SHIPPED | OUTPATIENT
Start: 2022-11-23 | End: 2022-11-29

## 2022-11-23 SDOH — ECONOMIC STABILITY: FOOD INSECURITY: WITHIN THE PAST 12 MONTHS, THE FOOD YOU BOUGHT JUST DIDN'T LAST AND YOU DIDN'T HAVE MONEY TO GET MORE.: NEVER TRUE

## 2022-11-23 SDOH — ECONOMIC STABILITY: FOOD INSECURITY: WITHIN THE PAST 12 MONTHS, YOU WORRIED THAT YOUR FOOD WOULD RUN OUT BEFORE YOU GOT MONEY TO BUY MORE.: NEVER TRUE

## 2022-11-23 ASSESSMENT — ENCOUNTER SYMPTOMS
CONSTIPATION: 0
NAUSEA: 0
BACK PAIN: 1
COLOR CHANGE: 0
WHEEZING: 0
ABDOMINAL PAIN: 0
SHORTNESS OF BREATH: 0
COUGH: 0
SORE THROAT: 0
CHEST TIGHTNESS: 0
SINUS PRESSURE: 0
VOMITING: 0

## 2022-11-23 ASSESSMENT — PATIENT HEALTH QUESTIONNAIRE - PHQ9
SUM OF ALL RESPONSES TO PHQ QUESTIONS 1-9: 0
SUM OF ALL RESPONSES TO PHQ QUESTIONS 1-9: 0
2. FEELING DOWN, DEPRESSED OR HOPELESS: 0
SUM OF ALL RESPONSES TO PHQ9 QUESTIONS 1 & 2: 0
SUM OF ALL RESPONSES TO PHQ QUESTIONS 1-9: 0
1. LITTLE INTEREST OR PLEASURE IN DOING THINGS: 0
SUM OF ALL RESPONSES TO PHQ QUESTIONS 1-9: 0

## 2022-11-23 ASSESSMENT — SOCIAL DETERMINANTS OF HEALTH (SDOH): HOW HARD IS IT FOR YOU TO PAY FOR THE VERY BASICS LIKE FOOD, HOUSING, MEDICAL CARE, AND HEATING?: NOT HARD AT ALL

## 2022-11-23 ASSESSMENT — LIFESTYLE VARIABLES
HOW MANY STANDARD DRINKS CONTAINING ALCOHOL DO YOU HAVE ON A TYPICAL DAY: PATIENT DOES NOT DRINK
HOW OFTEN DO YOU HAVE A DRINK CONTAINING ALCOHOL: NEVER

## 2022-11-23 NOTE — ASSESSMENT & PLAN NOTE
Improved with physical therapy however continues to have some residual pain specially on pressure and laying on right side. Advised will do a Medrol Dosepak to help with residual inflammation, if still no significant improvement then will refer to orthopedic surgery for further evaluation and possibly intertrochanteric steroid injection.   We will check x-rays to assess for arthritic changes, patient will continue with daily stretches and core strengthening exercises

## 2022-11-23 NOTE — ASSESSMENT & PLAN NOTE
Patient is scheduled for colonoscopy next month, she will complete flu vaccine today, she did complete booster vaccine for COVID-19

## 2022-11-23 NOTE — ASSESSMENT & PLAN NOTE
Results of thyroid function explained to patient, TSH improved although still slightly higher than target goal, free T4 remains within normal, advised we will continue current dose of levothyroxine and recheck in 3 months, if TSH still borderline high will reduce the dose to 25 MCG.

## 2022-11-23 NOTE — PROGRESS NOTES
Medicare Annual Wellness Visit  Name: Kellee Meza Date: 2022   MRN: [id number] Sex: Female   Age: 79 y.o. Ethnicity: Non- / Non    : 1955 Race: White (non-)      Gilbert Crowell is here for Medicare AWV     Screenings for behavioral, psychosocial and functional/safety risks, and cognitive dysfunction are all negative except as indicated below. These results, as well as other patient data from the [address] form, are documented in Flowsheets linked to this Encounter. No Known Allergies    Prior to Visit Medications    Medication Sig Taking? Authorizing Provider   methylPREDNISolone (MEDROL DOSEPACK) 4 MG tablet Take by mouth. Yes Forest Richter MD   levothyroxine (SYNTHROID) 50 MCG tablet Take 1 tablet by mouth daily  Forest Richter MD       Past Medical History:   Diagnosis Date    Hyperlipidemia     Hypothyroidism      Past Surgical History:   Procedure Laterality Date    DILATION AND CURETTAGE OF UTERUS      OTHER SURGICAL HISTORY  2019    pelvic prolapse       Family History   Problem Relation Age of Onset    Cancer Mother         Kidney    Alzheimer's Disease Father        CareTeam (Including outside providers/suppliers regularly involved in providing care):   Patient Care Team:  Forest Richter MD as PCP - General (Internal Medicine)  Forest Richter MD as PCP [facility]    Wt Readings from Last 3 Encounters:   22 138 lb 12.8 oz (63 kg)   09/15/22 140 lb 6.4 oz (63.7 kg)   22 149 lb 3.2 oz (67.7 kg)     Vitals:    22 1002   BP: 110/70   Pulse: 50   Resp: 24   SpO2: 99%   Weight: 138 lb 12.8 oz (63 kg)   Height: 5' 4\" (1.626 m)     Body mass index is 23.82 kg/m². Based upon direct observation of the patient, evaluation of cognition reveals recent and remote memory intact. Review of Systems   Constitutional:  Negative for activity change, appetite change, fatigue and unexpected weight change.    HENT:  Negative for congestion, hearing loss, mouth sores, sinus pressure and sore throat. Eyes:  Negative for visual disturbance. Respiratory:  Negative for cough, chest tightness, shortness of breath and wheezing. Cardiovascular:  Negative for chest pain, palpitations and leg swelling. Gastrointestinal:  Negative for abdominal pain, constipation, nausea and vomiting. Genitourinary:  Negative for difficulty urinating, dysuria, frequency, hematuria and urgency. Musculoskeletal:  Positive for arthralgias, back pain and neck pain. Negative for gait problem and joint swelling. Skin:  Negative for color change. Allergic/Immunologic: Negative for environmental allergies and immunocompromised state. Neurological:  Negative for dizziness, weakness, light-headedness and headaches. Hematological:  Does not bruise/bleed easily. Psychiatric/Behavioral:  Negative for behavioral problems, dysphoric mood and sleep disturbance. The patient is not nervous/anxious. Physical Exam  Constitutional:       Appearance: Normal appearance. She is normal weight. HENT:      Head: Normocephalic. Right Ear: Tympanic membrane and ear canal normal.      Left Ear: Tympanic membrane and ear canal normal.      Nose: Nose normal.      Mouth/Throat:      Mouth: Mucous membranes are moist.      Pharynx: Oropharynx is clear. Eyes:      Extraocular Movements: Extraocular movements intact. Conjunctiva/sclera: Conjunctivae normal.      Pupils: Pupils are equal, round, and reactive to light. Cardiovascular:      Rate and Rhythm: Normal rate and regular rhythm. Pulses: Normal pulses. Heart sounds: Normal heart sounds. No murmur heard. Pulmonary:      Effort: Pulmonary effort is normal. No respiratory distress. Breath sounds: Normal breath sounds. No wheezing. Abdominal:      General: Bowel sounds are normal.      Palpations: Abdomen is soft. There is no mass. Tenderness: There is no abdominal tenderness. Musculoskeletal:         General: No swelling, deformity or signs of injury. Normal range of motion. Cervical back: Normal range of motion and neck supple. Right lower leg: No edema. Left lower leg: No edema. Skin:     General: Skin is warm and dry. Neurological:      General: No focal deficit present. Mental Status: She is alert and oriented to person, place, and time. Mental status is at baseline. Cranial Nerves: No cranial nerve deficit. Psychiatric:         Mood and Affect: Mood normal.         Behavior: Behavior normal.         Thought Content: Thought content normal.        Patient's complete Health Risk Assessment and screening values have been reviewed and are found in Flowsheets. The following problems were reviewed today and where indicated follow up appointments were made and/or referrals ordered.     Positive Risk Factor Screenings with Interventions:          General Health and ACP:  General  In general, how would you say your health is?: Good  In the past 7 days, have you experienced any of the following: New or Increased Pain, New or Increased Fatigue, Loneliness, Social Isolation, Stress or Anger?: (!) Yes  Select all that apply: (!) New or Increased Pain, Stress, Anger  Do you get the social and emotional support that you need?: Yes  Do you have a Living Will?: (!) No    Advance Directives       Power of  Living Will ACP-Advance Directive ACP-Power of     Not on File Not on File Not on File Not on File        General Health Risk Interventions:  No Living Will: Advance Care Planning addressed with patient today     Hearing/Vision:  Do you or your family notice any trouble with your hearing that hasn't been managed with hearing aids?: No  Do you have difficulty driving, watching TV, or doing any of your daily activities because of your eyesight?: (!) Yes (in the dark and/or rain)  Have you had an eye exam within the past year?: (!) No  No results found.  Hearing/Vision Interventions:  No concerns        Personalized Preventive Plan   Current Health Maintenance Status  Immunization History   Administered Date(s) Administered    COVID-19, PFIZER PURPLE top, DILUTE for use, (age 15 y+), 30mcg/0.3mL 01/29/2021, 02/20/2021, 11/29/2021    Influenza Virus Vaccine 10/14/2011, 08/30/2012, 09/16/2020    Influenza, FLUZONE (age 72 y+), High Dose, 0.7mL 10/14/2021    Pneumococcal Polysaccharide ([id number]) 11/22/2021    Tdap (Boostrix, Adacel) 11/04/2014    Zoster Recombinant (Shingrix) 10/21/2022      Health Maintenance   Topic Date Due    Colorectal Cancer Screen  Never done    COVID-19 Vaccine (4 - Booster for Alvarez Lucian series) 01/24/2022    Flu vaccine (1) 08/01/2022    Shingles vaccine (2 of 2) 12/16/2022    Depression Screen  09/15/2023    Annual Wellness Visit (AWV)  11/24/2023    Breast cancer screen  01/12/2024    DTaP/Tdap/Td vaccine (2 - Td or Tdap) 11/04/2024    Lipids  11/22/2026    DEXA (modify frequency per FRAX score)  Completed    Pneumococcal 65+ years Vaccine  Completed    Hepatitis C screen  Completed    Hepatitis A vaccine  Aged Out    Hib vaccine  Aged Out    Meningococcal (ACWY) vaccine  Aged Out     Recommendations for [employer] Due: see orders and patient instructions/AVS.    1. Medicare annual wellness visit, subsequent  Assessment & Plan:   Patient is scheduled for colonoscopy next month, she will complete flu vaccine today, she did complete booster vaccine for COVID-19  2. Trochanteric bursitis of right hip  Assessment & Plan:   Improved with physical therapy however continues to have some residual pain specially on pressure and laying on right side. Advised will do a Medrol Dosepak to help with residual inflammation, if still no significant improvement then will refer to orthopedic surgery for further evaluation and possibly intertrochanteric steroid injection.   We will check x-rays to assess for arthritic changes, patient will continue with daily stretches and core strengthening exercises  Orders:  -     XR HIP RIGHT (1 VIEW); Future  3. Acquired hypothyroidism  Assessment & Plan:   Results of thyroid function explained to patient, TSH improved although still slightly higher than target goal, free T4 remains within normal, advised we will continue current dose of levothyroxine and recheck in 3 months, if TSH still borderline high will reduce the dose to 25 MCG. Orders:  -     T4, Free; Future  -     TSH with Reflex to FT4; Future  4. Spondylosis of cervical region without myelopathy or radiculopathy  -     methylPREDNISolone (MEDROL DOSEPACK) 4 MG tablet; Take by mouth., Disp-1 kit, R-0Normal  5. Degeneration of lumbar or lumbosacral intervertebral disc  -     methylPREDNISolone (MEDROL DOSEPACK) 4 MG tablet; Take by mouth., Disp-1 kit, R-0Normal  6. Arthralgia of left hand  -     methylPREDNISolone (MEDROL DOSEPACK) 4 MG tablet; Take by mouth., Disp-1 kit, R-0Normal  -     XR HAND LEFT (2 VIEWS); Future  7. Flu vaccine need  -     Influenza, FLUAD, (age 72 y+), IM, Preservative Free, 0.5 mL    Return in about 6 months (around 5/23/2023).     Recommended screening schedule for the next 5-10 years is provided to the patient in written form: see Patient Instructions/AVS.    Electronically signed by Caty Acosta MD on 11/23/2022 at 10:43 AM.

## 2022-11-28 ENCOUNTER — HOSPITAL ENCOUNTER (OUTPATIENT)
Dept: GENERAL RADIOLOGY | Age: 67
Discharge: HOME | End: 2022-11-28
Payer: MEDICARE

## 2022-11-28 ENCOUNTER — HOSPITAL ENCOUNTER (OUTPATIENT)
Age: 67
Discharge: HOME | End: 2022-11-28
Payer: MEDICARE

## 2022-11-28 DIAGNOSIS — M70.61 TROCHANTERIC BURSITIS OF RIGHT HIP: ICD-10-CM

## 2022-11-28 DIAGNOSIS — M25.542 ARTHRALGIA OF LEFT HAND: ICD-10-CM

## 2022-11-28 PROCEDURE — 73502 X-RAY EXAM HIP UNI 2-3 VIEWS: CPT

## 2022-11-28 PROCEDURE — 73130 X-RAY EXAM OF HAND: CPT

## 2022-12-23 PROBLEM — Z00.00 MEDICARE ANNUAL WELLNESS VISIT, SUBSEQUENT: Status: RESOLVED | Noted: 2021-08-17 | Resolved: 2022-12-23

## 2022-12-27 DIAGNOSIS — E03.9 ACQUIRED HYPOTHYROIDISM: ICD-10-CM

## 2022-12-27 RX ORDER — LEVOTHYROXINE SODIUM 0.05 MG/1
50 TABLET ORAL DAILY
Qty: 90 TABLET | Refills: 1 | Status: SHIPPED | OUTPATIENT
Start: 2022-12-27

## 2023-03-13 ENCOUNTER — TELEPHONE (OUTPATIENT)
Dept: INTERNAL MEDICINE CLINIC | Age: 68
End: 2023-03-13

## 2023-03-13 NOTE — TELEPHONE ENCOUNTER
----- Message from Jose M Finn sent at 3/13/2023 12:22 PM EDT -----  Subject: Message to Provider    QUESTIONS  Information for Provider? pt stated that she is needing someone to contact   the [facility] in regards to her getting her colonoscopy,   she stated that she is needing get credit for that she got it done on   December 9th 2020 with Brooklynn Forman?, she said that he was told that [address]   has to contact them number is [phone] she is needing it to be added   to her chart, please follow up  ---------------------------------------------------------------------------  --------------  [id number]  [phone]; OK to leave message on voicemail  ---------------------------------------------------------------------------  --------------  SCRIPT ANSWERS  Relationship to Patient?  Self

## 2023-03-13 NOTE — TELEPHONE ENCOUNTER
Called patient to clarify message and she states date of colonoscopy was 12/9/22. I called [facility] to get a copy and was advised office needs to fax request to [phone].

## 2023-03-22 ENCOUNTER — TELEPHONE (OUTPATIENT)
Dept: INTERNAL MEDICINE CLINIC | Age: 68
End: 2023-03-22

## 2023-03-22 DIAGNOSIS — E03.9 ACQUIRED HYPOTHYROIDISM: ICD-10-CM

## 2023-03-22 LAB
T4 FREE SERPL-MCNC: 1.3 NG/DL (ref 0.9–1.8)
TSH SERPL DL<=0.005 MIU/L-ACNC: 4.03 UIU/ML (ref 0.27–4.2)

## 2023-03-22 NOTE — TELEPHONE ENCOUNTER
Pt came in following up on the status of the office receiving her Colonoscopy results. I did let the pt know that Dr. Maggie Ching is out of the office this week so we may have received it but it won't be added to her chart until it's looked over by the provider.

## 2023-06-02 ENCOUNTER — OFFICE VISIT (OUTPATIENT)
Dept: INTERNAL MEDICINE CLINIC | Age: 68
End: 2023-06-02
Payer: MEDICARE

## 2023-06-02 VITALS
BODY MASS INDEX: 23.87 KG/M2 | SYSTOLIC BLOOD PRESSURE: 110 MMHG | HEART RATE: 55 BPM | HEIGHT: 64 IN | OXYGEN SATURATION: 98 % | WEIGHT: 139.8 LBS | DIASTOLIC BLOOD PRESSURE: 68 MMHG

## 2023-06-02 DIAGNOSIS — E78.2 MIXED HYPERLIPIDEMIA: ICD-10-CM

## 2023-06-02 DIAGNOSIS — L60.3 NAIL BREAKING: ICD-10-CM

## 2023-06-02 DIAGNOSIS — L29.9 ITCHING OF EAR: ICD-10-CM

## 2023-06-02 DIAGNOSIS — E03.9 ACQUIRED HYPOTHYROIDISM: Primary | ICD-10-CM

## 2023-06-02 DIAGNOSIS — M47.812 SPONDYLOSIS OF CERVICAL REGION WITHOUT MYELOPATHY OR RADICULOPATHY: ICD-10-CM

## 2023-06-02 PROCEDURE — G8420 CALC BMI NORM PARAMETERS: HCPCS | Performed by: INTERNAL MEDICINE

## 2023-06-02 PROCEDURE — 1123F ACP DISCUSS/DSCN MKR DOCD: CPT | Performed by: INTERNAL MEDICINE

## 2023-06-02 PROCEDURE — 1090F PRES/ABSN URINE INCON ASSESS: CPT | Performed by: INTERNAL MEDICINE

## 2023-06-02 PROCEDURE — 3017F COLORECTAL CA SCREEN DOC REV: CPT | Performed by: INTERNAL MEDICINE

## 2023-06-02 PROCEDURE — G8427 DOCREV CUR MEDS BY ELIG CLIN: HCPCS | Performed by: INTERNAL MEDICINE

## 2023-06-02 PROCEDURE — 99214 OFFICE O/P EST MOD 30 MIN: CPT | Performed by: INTERNAL MEDICINE

## 2023-06-02 PROCEDURE — G8400 PT W/DXA NO RESULTS DOC: HCPCS | Performed by: INTERNAL MEDICINE

## 2023-06-02 PROCEDURE — 1036F TOBACCO NON-USER: CPT | Performed by: INTERNAL MEDICINE

## 2023-06-02 SDOH — ECONOMIC STABILITY: HOUSING INSECURITY
IN THE LAST 12 MONTHS, WAS THERE A TIME WHEN YOU DID NOT HAVE A STEADY PLACE TO SLEEP OR SLEPT IN A SHELTER (INCLUDING NOW)?: NO

## 2023-06-02 SDOH — ECONOMIC STABILITY: FOOD INSECURITY: WITHIN THE PAST 12 MONTHS, YOU WORRIED THAT YOUR FOOD WOULD RUN OUT BEFORE YOU GOT MONEY TO BUY MORE.: NEVER TRUE

## 2023-06-02 SDOH — ECONOMIC STABILITY: FOOD INSECURITY: WITHIN THE PAST 12 MONTHS, THE FOOD YOU BOUGHT JUST DIDN'T LAST AND YOU DIDN'T HAVE MONEY TO GET MORE.: NEVER TRUE

## 2023-06-02 SDOH — ECONOMIC STABILITY: INCOME INSECURITY: HOW HARD IS IT FOR YOU TO PAY FOR THE VERY BASICS LIKE FOOD, HOUSING, MEDICAL CARE, AND HEATING?: NOT HARD AT ALL

## 2023-06-02 ASSESSMENT — PATIENT HEALTH QUESTIONNAIRE - PHQ9
SUM OF ALL RESPONSES TO PHQ QUESTIONS 1-9: 0
SUM OF ALL RESPONSES TO PHQ QUESTIONS 1-9: 0
1. LITTLE INTEREST OR PLEASURE IN DOING THINGS: 0
SUM OF ALL RESPONSES TO PHQ QUESTIONS 1-9: 0
SUM OF ALL RESPONSES TO PHQ9 QUESTIONS 1 & 2: 0
SUM OF ALL RESPONSES TO PHQ QUESTIONS 1-9: 0
2. FEELING DOWN, DEPRESSED OR HOPELESS: 0

## 2023-06-02 ASSESSMENT — ENCOUNTER SYMPTOMS
NAUSEA: 0
COLOR CHANGE: 0
CONSTIPATION: 0
COUGH: 0
BACK PAIN: 0
ABDOMINAL PAIN: 0
SORE THROAT: 0
CHEST TIGHTNESS: 0
VOMITING: 0
WHEEZING: 0
SHORTNESS OF BREATH: 0

## 2023-06-02 NOTE — PROGRESS NOTES
ASSESSMENT/PLAN:  1. Acquired hypothyroidism  Assessment & Plan:   Stable on current dose of levothyroxine, continue same, recheck labs next visit when due for her annual wellness  2. Spondylosis of cervical region without myelopathy or radiculopathy  Assessment & Plan:   Symptoms improved significantly, she completed physical therapy and then started doing a Pilates and now reports hardly feeling any pain and has full range of motion, encouraged to continue daily exercises and active lifestyle  3. Mixed hyperlipidemia  Assessment & Plan:   Results of lipid panel reviewed, encouraged healthy low-fat diet and active lifestyle, no need for medication at this point, we will recheck labs at her next follow-up visit  4. Itching of ear  Assessment & Plan:   Exam within normal findings, advised can try daily antihistamine as Zyrtec or Benadryl to see if this will help. 5. Nail breaking  Assessment & Plan:   Breaking mostly limited to nail tips, nail plates appear to be healthy. Advised patient can continue biotin however recommended calcium and vitamin D supplements, can also do a trial of zinc supplement, keep nail length below tip of fingers. No need for any further work-up at this point      Return in about 6 months (around 12/2/2023) for annual physical.     SUBJECTIVE  HPI:   Patient here for routine follow-up, has concerns about ear itching since late winter, she denies any trouble hearing or ear pain just itching inside both ears. She also has concerns about her finger nails breaking easily. Other than that feeling good denies any concerns      Review of Systems   Constitutional:  Negative for activity change, appetite change and fatigue. HENT:  Negative for congestion, hearing loss, mouth sores and sore throat. Respiratory:  Negative for cough, chest tightness, shortness of breath and wheezing. Cardiovascular:  Negative for chest pain, palpitations and leg swelling.    Gastrointestinal:  Negative for

## 2023-06-02 NOTE — ASSESSMENT & PLAN NOTE
Exam within normal findings, advised can try daily antihistamine as Zyrtec or Benadryl to see if this will help.

## 2023-06-02 NOTE — ASSESSMENT & PLAN NOTE
Symptoms improved significantly, she completed physical therapy and then started doing a Pilates and now reports hardly feeling any pain and has full range of motion, encouraged to continue daily exercises and active lifestyle

## 2023-06-02 NOTE — ASSESSMENT & PLAN NOTE
Stable on current dose of levothyroxine, continue same, recheck labs next visit when due for her annual wellness

## 2023-06-02 NOTE — ASSESSMENT & PLAN NOTE
Breaking mostly limited to nail tips, nail plates appear to be healthy. Advised patient can continue biotin however recommended calcium and vitamin D supplements, can also do a trial of zinc supplement, keep nail length below tip of fingers.   No need for any further work-up at this point

## 2023-06-02 NOTE — ASSESSMENT & PLAN NOTE
Results of lipid panel reviewed, encouraged healthy low-fat diet and active lifestyle, no need for medication at this point, we will recheck labs at her next follow-up visit

## 2023-07-10 DIAGNOSIS — E03.9 ACQUIRED HYPOTHYROIDISM: ICD-10-CM

## 2023-07-10 RX ORDER — LEVOTHYROXINE SODIUM 0.05 MG/1
TABLET ORAL
Qty: 90 TABLET | Refills: 3 | Status: SHIPPED | OUTPATIENT
Start: 2023-07-10

## 2023-08-11 ENCOUNTER — OFFICE VISIT (OUTPATIENT)
Dept: INTERNAL MEDICINE CLINIC | Age: 68
End: 2023-08-11

## 2023-08-11 VITALS
WEIGHT: 140.2 LBS | HEIGHT: 64 IN | BODY MASS INDEX: 23.93 KG/M2 | DIASTOLIC BLOOD PRESSURE: 68 MMHG | OXYGEN SATURATION: 99 % | SYSTOLIC BLOOD PRESSURE: 106 MMHG | HEART RATE: 54 BPM

## 2023-08-11 DIAGNOSIS — Z02.1 NEED FOR HISTORY AND PHYSICAL EXAMINATION FOR EMPLOYMENT: ICD-10-CM

## 2023-08-11 ASSESSMENT — ENCOUNTER SYMPTOMS
SORE THROAT: 0
ABDOMINAL PAIN: 0
BACK PAIN: 0
CONSTIPATION: 0
VOMITING: 0
COLOR CHANGE: 0
SHORTNESS OF BREATH: 0
WHEEZING: 0
COUGH: 0
CHEST TIGHTNESS: 0
NAUSEA: 0

## 2023-08-11 NOTE — ASSESSMENT & PLAN NOTE
form filled out for H&P, today's exam is within normal limits, patient up-to-date with age-related vaccines, she is aware to update flu vaccine in early fall  Healthy diet and regular exercise recommendations reinforced  Remains on levothyroxine, labs recently checked and thyroid function is at goal, she will follow-up in 6 months for her wellness as scheduled previously

## 2023-09-27 ENCOUNTER — OFFICE VISIT (OUTPATIENT)
Dept: INTERNAL MEDICINE CLINIC | Age: 68
End: 2023-09-27
Payer: MEDICARE

## 2023-09-27 VITALS
SYSTOLIC BLOOD PRESSURE: 118 MMHG | WEIGHT: 139.8 LBS | HEART RATE: 52 BPM | OXYGEN SATURATION: 99 % | HEIGHT: 64 IN | DIASTOLIC BLOOD PRESSURE: 82 MMHG | BODY MASS INDEX: 23.87 KG/M2

## 2023-09-27 DIAGNOSIS — N39.0 URINARY TRACT INFECTION WITHOUT HEMATURIA, SITE UNSPECIFIED: Primary | ICD-10-CM

## 2023-09-27 DIAGNOSIS — Z87.42 HISTORY OF UTERINE PROLAPSE: ICD-10-CM

## 2023-09-27 DIAGNOSIS — E03.9 ACQUIRED HYPOTHYROIDISM: ICD-10-CM

## 2023-09-27 LAB
BILIRUBIN, POC: NORMAL
BLOOD URINE, POC: NORMAL
CLARITY, POC: NORMAL
COLOR, POC: NORMAL
GLUCOSE URINE, POC: NORMAL
KETONES, POC: NORMAL
LEUKOCYTE EST, POC: NORMAL
NITRITE, POC: NORMAL
PH, POC: 8.5
PROTEIN, POC: NORMAL
SPECIFIC GRAVITY, POC: 1.02
UROBILINOGEN, POC: 0.2

## 2023-09-27 PROCEDURE — G8420 CALC BMI NORM PARAMETERS: HCPCS | Performed by: INTERNAL MEDICINE

## 2023-09-27 PROCEDURE — 99213 OFFICE O/P EST LOW 20 MIN: CPT | Performed by: INTERNAL MEDICINE

## 2023-09-27 PROCEDURE — 1123F ACP DISCUSS/DSCN MKR DOCD: CPT | Performed by: INTERNAL MEDICINE

## 2023-09-27 PROCEDURE — G8427 DOCREV CUR MEDS BY ELIG CLIN: HCPCS | Performed by: INTERNAL MEDICINE

## 2023-09-27 PROCEDURE — 1090F PRES/ABSN URINE INCON ASSESS: CPT | Performed by: INTERNAL MEDICINE

## 2023-09-27 PROCEDURE — 1036F TOBACCO NON-USER: CPT | Performed by: INTERNAL MEDICINE

## 2023-09-27 PROCEDURE — 3017F COLORECTAL CA SCREEN DOC REV: CPT | Performed by: INTERNAL MEDICINE

## 2023-09-27 PROCEDURE — 81002 URINALYSIS NONAUTO W/O SCOPE: CPT | Performed by: INTERNAL MEDICINE

## 2023-09-27 PROCEDURE — G8400 PT W/DXA NO RESULTS DOC: HCPCS | Performed by: INTERNAL MEDICINE

## 2023-09-27 RX ORDER — CIPROFLOXACIN 250 MG/1
250 TABLET, FILM COATED ORAL 2 TIMES DAILY
Qty: 14 TABLET | Refills: 0 | Status: SHIPPED | OUTPATIENT
Start: 2023-09-27 | End: 2023-10-04

## 2023-09-27 RX ORDER — LEVOTHYROXINE SODIUM 0.05 MG/1
50 TABLET ORAL DAILY
Qty: 90 TABLET | Refills: 3 | Status: SHIPPED | OUTPATIENT
Start: 2023-09-27

## 2023-09-27 ASSESSMENT — ENCOUNTER SYMPTOMS
SHORTNESS OF BREATH: 0
VOMITING: 0
COUGH: 0
NAUSEA: 0
BACK PAIN: 0
ABDOMINAL PAIN: 0
SORE THROAT: 0
CONSTIPATION: 0
COLOR CHANGE: 0
WHEEZING: 0
CHEST TIGHTNESS: 0

## 2023-09-27 NOTE — ASSESSMENT & PLAN NOTE
POC urine showing large leukocytes, advised patient will start empiric therapy with ciprofloxacin, will send urine for culture and adjust antibiotics accordingly, encouraged to increase water intake, can use Azo or generic equivalent for bladder spasm. Advised if cultures are negative and given past history of pelvic floor weakness and prolapse may need to be referred to urogynecology for reevaluation.

## 2023-09-27 NOTE — PROGRESS NOTES
ASSESSMENT/PLAN:  1. Urinary tract infection without hematuria, site unspecified  Assessment & Plan:    POC urine showing large leukocytes, advised patient will start empiric therapy with ciprofloxacin, will send urine for culture and adjust antibiotics accordingly, encouraged to increase water intake, can use Azo or generic equivalent for bladder spasm. Advised if cultures are negative and given past history of pelvic floor weakness and prolapse may need to be referred to urogynecology for reevaluation. Orders:  -     ciprofloxacin (CIPRO) 250 MG tablet; Take 1 tablet by mouth 2 times daily for 7 days, Disp-14 tablet, R-0Normal  -     Culture, Urine  2. Acquired hypothyroidism  -     levothyroxine (SYNTHROID) 50 MCG tablet; Take 1 tablet by mouth daily, Disp-90 tablet, R-3Normal  3. History of uterine prolapse      No follow-ups on file. SUBJECTIVE  HPI:   Patient complaining of pelvic and suprapubic discomfort that is been going on and off for almost 2 months now, she has been managing symptoms with Azo but recently has not taken any and felt symptoms worsening with urine cloudy and her sister told her she may have a UTI. She is denying dysuria however reports frequency of urination    Frequent/Recurrent UTI  This is a recurrent problem. The problem has been waxing and waning since onset. Associated symptoms include pain. Pertinent negatives include no hematuria. The pain is present in the pelvis. Review of Systems   Constitutional:  Negative for activity change, appetite change and fatigue. HENT:  Negative for congestion, hearing loss, mouth sores and sore throat. Respiratory:  Negative for cough, chest tightness, shortness of breath and wheezing. Cardiovascular:  Negative for chest pain, palpitations and leg swelling. Gastrointestinal:  Negative for abdominal pain, constipation, nausea and vomiting. Genitourinary:  Positive for frequency and pelvic pain.  Negative for difficulty

## 2023-09-29 LAB
BACTERIA UR CULT: ABNORMAL
ORGANISM: ABNORMAL

## 2023-12-04 ENCOUNTER — OFFICE VISIT (OUTPATIENT)
Dept: INTERNAL MEDICINE CLINIC | Age: 68
End: 2023-12-04
Payer: MEDICARE

## 2023-12-04 VITALS
OXYGEN SATURATION: 98 % | HEART RATE: 78 BPM | DIASTOLIC BLOOD PRESSURE: 76 MMHG | SYSTOLIC BLOOD PRESSURE: 124 MMHG | BODY MASS INDEX: 24.55 KG/M2 | WEIGHT: 143 LBS

## 2023-12-04 DIAGNOSIS — E03.9 ACQUIRED HYPOTHYROIDISM: ICD-10-CM

## 2023-12-04 DIAGNOSIS — Z12.31 ENCOUNTER FOR SCREENING MAMMOGRAM FOR MALIGNANT NEOPLASM OF BREAST: ICD-10-CM

## 2023-12-04 DIAGNOSIS — Z23 FLU VACCINE NEED: ICD-10-CM

## 2023-12-04 DIAGNOSIS — Z00.00 MEDICARE ANNUAL WELLNESS VISIT, SUBSEQUENT: Primary | ICD-10-CM

## 2023-12-04 DIAGNOSIS — E78.2 MIXED HYPERLIPIDEMIA: ICD-10-CM

## 2023-12-04 PROBLEM — Z02.1 NEED FOR HISTORY AND PHYSICAL EXAMINATION FOR EMPLOYMENT: Status: RESOLVED | Noted: 2023-08-11 | Resolved: 2023-12-04

## 2023-12-04 PROBLEM — N39.0 URINARY TRACT INFECTION WITHOUT HEMATURIA: Status: RESOLVED | Noted: 2023-09-27 | Resolved: 2023-12-04

## 2023-12-04 PROCEDURE — G0439 PPPS, SUBSEQ VISIT: HCPCS | Performed by: INTERNAL MEDICINE

## 2023-12-04 PROCEDURE — G0008 ADMIN INFLUENZA VIRUS VAC: HCPCS | Performed by: INTERNAL MEDICINE

## 2023-12-04 PROCEDURE — 3017F COLORECTAL CA SCREEN DOC REV: CPT | Performed by: INTERNAL MEDICINE

## 2023-12-04 PROCEDURE — G8484 FLU IMMUNIZE NO ADMIN: HCPCS | Performed by: INTERNAL MEDICINE

## 2023-12-04 PROCEDURE — 90694 VACC AIIV4 NO PRSRV 0.5ML IM: CPT | Performed by: INTERNAL MEDICINE

## 2023-12-04 PROCEDURE — 1123F ACP DISCUSS/DSCN MKR DOCD: CPT | Performed by: INTERNAL MEDICINE

## 2023-12-04 ASSESSMENT — LIFESTYLE VARIABLES
HOW OFTEN DO YOU HAVE A DRINK CONTAINING ALCOHOL: MONTHLY OR LESS
HOW MANY STANDARD DRINKS CONTAINING ALCOHOL DO YOU HAVE ON A TYPICAL DAY: 1 OR 2

## 2023-12-04 ASSESSMENT — ENCOUNTER SYMPTOMS
CONSTIPATION: 0
SHORTNESS OF BREATH: 0
ABDOMINAL PAIN: 0
BACK PAIN: 0
COUGH: 0
COLOR CHANGE: 0
NAUSEA: 0
CHEST TIGHTNESS: 0
VOMITING: 0
WHEEZING: 0
SORE THROAT: 0

## 2023-12-04 ASSESSMENT — PATIENT HEALTH QUESTIONNAIRE - PHQ9
2. FEELING DOWN, DEPRESSED OR HOPELESS: 0
SUM OF ALL RESPONSES TO PHQ QUESTIONS 1-9: 0
SUM OF ALL RESPONSES TO PHQ QUESTIONS 1-9: 0
1. LITTLE INTEREST OR PLEASURE IN DOING THINGS: 0
SUM OF ALL RESPONSES TO PHQ QUESTIONS 1-9: 0
SUM OF ALL RESPONSES TO PHQ QUESTIONS 1-9: 0
SUM OF ALL RESPONSES TO PHQ9 QUESTIONS 1 & 2: 0

## 2023-12-04 NOTE — PROGRESS NOTES
Negative for activity change, appetite change, fatigue and unexpected weight change. HENT:  Negative for congestion, hearing loss, mouth sores and sore throat. Eyes:  Positive for visual disturbance. Respiratory:  Negative for cough, chest tightness, shortness of breath and wheezing. Cardiovascular:  Negative for chest pain, palpitations and leg swelling. Gastrointestinal:  Negative for abdominal pain, constipation, nausea and vomiting. Endocrine: Negative for cold intolerance and heat intolerance. Genitourinary:  Negative for difficulty urinating, dysuria, frequency, hematuria, urgency and vaginal bleeding. Musculoskeletal:  Negative for arthralgias, back pain, gait problem and joint swelling. Skin:  Negative for color change. Allergic/Immunologic: Negative for environmental allergies and immunocompromised state. Neurological:  Negative for dizziness, speech difficulty, weakness, light-headedness and headaches. Hematological:  Does not bruise/bleed easily. Psychiatric/Behavioral:  Negative for behavioral problems, dysphoric mood and sleep disturbance. The patient is not nervous/anxious. Physical Exam  Constitutional:       General: She is not in acute distress. Appearance: Normal appearance. She is normal weight. She is not toxic-appearing. HENT:      Head: Normocephalic. Right Ear: Tympanic membrane, ear canal and external ear normal.      Left Ear: Tympanic membrane, ear canal and external ear normal.      Mouth/Throat:      Mouth: Mucous membranes are moist.      Pharynx: Oropharynx is clear. No oropharyngeal exudate. Eyes:      Conjunctiva/sclera: Conjunctivae normal.      Pupils: Pupils are equal, round, and reactive to light. Neck:      Vascular: No carotid bruit. Cardiovascular:      Rate and Rhythm: Normal rate and regular rhythm. Heart sounds: Normal heart sounds. No murmur heard.   Pulmonary:      Effort: Pulmonary effort is normal. No respiratory

## 2023-12-08 DIAGNOSIS — E78.2 MIXED HYPERLIPIDEMIA: ICD-10-CM

## 2023-12-08 DIAGNOSIS — E03.9 ACQUIRED HYPOTHYROIDISM: ICD-10-CM

## 2023-12-08 LAB
ALBUMIN SERPL-MCNC: 4.4 G/DL (ref 3.4–5)
ALBUMIN/GLOB SERPL: 2.3 {RATIO} (ref 1.1–2.2)
ALP SERPL-CCNC: 49 U/L (ref 40–129)
ALT SERPL-CCNC: 13 U/L (ref 10–40)
ANION GAP SERPL CALCULATED.3IONS-SCNC: 10 MMOL/L (ref 3–16)
AST SERPL-CCNC: 19 U/L (ref 15–37)
BILIRUB SERPL-MCNC: 0.4 MG/DL (ref 0–1)
BUN SERPL-MCNC: 10 MG/DL (ref 7–20)
CALCIUM SERPL-MCNC: 9.2 MG/DL (ref 8.3–10.6)
CHLORIDE SERPL-SCNC: 104 MMOL/L (ref 99–110)
CHOLEST SERPL-MCNC: 238 MG/DL (ref 0–199)
CO2 SERPL-SCNC: 27 MMOL/L (ref 21–32)
CREAT SERPL-MCNC: 0.7 MG/DL (ref 0.6–1.2)
DEPRECATED RDW RBC AUTO: 12.9 % (ref 12.4–15.4)
GFR SERPLBLD CREATININE-BSD FMLA CKD-EPI: >60 ML/MIN/{1.73_M2}
GLUCOSE SERPL-MCNC: 88 MG/DL (ref 70–99)
HCT VFR BLD AUTO: 38.6 % (ref 36–48)
HDLC SERPL-MCNC: 66 MG/DL (ref 40–60)
HGB BLD-MCNC: 13 G/DL (ref 12–16)
LDLC SERPL CALC-MCNC: 148 MG/DL
MCH RBC QN AUTO: 31.2 PG (ref 26–34)
MCHC RBC AUTO-ENTMCNC: 33.8 G/DL (ref 31–36)
MCV RBC AUTO: 92.2 FL (ref 80–100)
PLATELET # BLD AUTO: 204 K/UL (ref 135–450)
PMV BLD AUTO: 10.1 FL (ref 5–10.5)
POTASSIUM SERPL-SCNC: 4.2 MMOL/L (ref 3.5–5.1)
PROT SERPL-MCNC: 6.3 G/DL (ref 6.4–8.2)
RBC # BLD AUTO: 4.18 M/UL (ref 4–5.2)
SODIUM SERPL-SCNC: 141 MMOL/L (ref 136–145)
TRIGL SERPL-MCNC: 118 MG/DL (ref 0–150)
TSH SERPL DL<=0.005 MIU/L-ACNC: 3.27 UIU/ML (ref 0.27–4.2)
VLDLC SERPL CALC-MCNC: 24 MG/DL
WBC # BLD AUTO: 5.1 K/UL (ref 4–11)

## 2023-12-11 ENCOUNTER — NURSE ONLY (OUTPATIENT)
Dept: INTERNAL MEDICINE CLINIC | Age: 68
End: 2023-12-11
Payer: MEDICARE

## 2023-12-11 DIAGNOSIS — Z23 NEED FOR VACCINATION: Primary | ICD-10-CM

## 2023-12-11 PROCEDURE — G0009 ADMIN PNEUMOCOCCAL VACCINE: HCPCS | Performed by: INTERNAL MEDICINE

## 2023-12-11 PROCEDURE — 90677 PCV20 VACCINE IM: CPT | Performed by: INTERNAL MEDICINE

## 2024-02-19 ENCOUNTER — OFFICE VISIT (OUTPATIENT)
Dept: INTERNAL MEDICINE CLINIC | Age: 69
End: 2024-02-19
Payer: MEDICARE

## 2024-02-19 VITALS — SYSTOLIC BLOOD PRESSURE: 119 MMHG | HEART RATE: 76 BPM | OXYGEN SATURATION: 98 % | DIASTOLIC BLOOD PRESSURE: 69 MMHG

## 2024-02-19 DIAGNOSIS — N39.0 URINARY TRACT INFECTION WITHOUT HEMATURIA, SITE UNSPECIFIED: Primary | ICD-10-CM

## 2024-02-19 LAB
BILIRUBIN, POC: NORMAL
BLOOD URINE, POC: NORMAL
CLARITY, POC: NORMAL
COLOR, POC: YELLOW
GLUCOSE URINE, POC: NORMAL
KETONES, POC: NORMAL
LEUKOCYTE EST, POC: NORMAL
NITRITE, POC: NORMAL
PH, POC: 7
PROTEIN, POC: NORMAL
SPECIFIC GRAVITY, POC: 1.02
UROBILINOGEN, POC: 0.2

## 2024-02-19 PROCEDURE — G8427 DOCREV CUR MEDS BY ELIG CLIN: HCPCS | Performed by: INTERNAL MEDICINE

## 2024-02-19 PROCEDURE — 81002 URINALYSIS NONAUTO W/O SCOPE: CPT | Performed by: INTERNAL MEDICINE

## 2024-02-19 PROCEDURE — 1123F ACP DISCUSS/DSCN MKR DOCD: CPT | Performed by: INTERNAL MEDICINE

## 2024-02-19 PROCEDURE — G8400 PT W/DXA NO RESULTS DOC: HCPCS | Performed by: INTERNAL MEDICINE

## 2024-02-19 PROCEDURE — G8484 FLU IMMUNIZE NO ADMIN: HCPCS | Performed by: INTERNAL MEDICINE

## 2024-02-19 PROCEDURE — 1090F PRES/ABSN URINE INCON ASSESS: CPT | Performed by: INTERNAL MEDICINE

## 2024-02-19 PROCEDURE — G8420 CALC BMI NORM PARAMETERS: HCPCS | Performed by: INTERNAL MEDICINE

## 2024-02-19 PROCEDURE — 99213 OFFICE O/P EST LOW 20 MIN: CPT | Performed by: INTERNAL MEDICINE

## 2024-02-19 PROCEDURE — 3017F COLORECTAL CA SCREEN DOC REV: CPT | Performed by: INTERNAL MEDICINE

## 2024-02-19 PROCEDURE — 1036F TOBACCO NON-USER: CPT | Performed by: INTERNAL MEDICINE

## 2024-02-19 RX ORDER — CIPROFLOXACIN 250 MG/1
250 TABLET, FILM COATED ORAL 2 TIMES DAILY
Qty: 14 TABLET | Refills: 0 | Status: SHIPPED | OUTPATIENT
Start: 2024-02-19 | End: 2024-02-26

## 2024-02-19 ASSESSMENT — ENCOUNTER SYMPTOMS
COUGH: 0
CHEST TIGHTNESS: 0
ABDOMINAL PAIN: 0
COLOR CHANGE: 0
CONSTIPATION: 0
SHORTNESS OF BREATH: 0
VOMITING: 0
WHEEZING: 0
SORE THROAT: 0
BACK PAIN: 0
NAUSEA: 0

## 2024-02-19 NOTE — PROGRESS NOTES
ASSESSMENT/PLAN:  1. Urinary tract infection without hematuria, site unspecified  Assessment & Plan:    symptoms discussed with patient, will go ahead and start empiric antibiotic therapy, encouraged increased water intake, advised if urine cultures are negative symptoms may in part be due to overactive bladder especially given past history of uterine prolapse.  Encouraged doing Kegel exercises, however further recommendations for overactive bladder treatment will be pending urine culture results as this may still be simple UTI.  Orders:  -     POCT Urinalysis no Micro  -     Culture, Urine  -     ciprofloxacin (CIPRO) 250 MG tablet; Take 1 tablet by mouth 2 times daily for 7 days, Disp-14 tablet, R-0Normal      Return for as scheduled.     SUBJECTIVE  HPI:   C/o UTI symptoms for almost 4 weeks.  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  She has been taking Azo but then decided to be seen to make sure it is not an infection since she had 1 back in September    Urinary Tract Infection  This is a new problem. The current episode started 1 to 4 weeks ago. The problem has been waxing and waning since onset. Pertinent negatives include no hematuria or pain.       Review of Systems   Constitutional:  Negative for activity change, appetite change and fatigue.   HENT:  Negative for congestion, hearing loss, mouth sores and sore throat.    Respiratory:  Negative for cough, chest tightness, shortness of breath and wheezing.    Cardiovascular:  Negative for chest pain, palpitations and leg swelling.   Gastrointestinal:  Negative for abdominal pain, constipation, nausea and vomiting.   Genitourinary:  Positive for frequency and urgency. Negative for

## 2024-02-19 NOTE — ASSESSMENT & PLAN NOTE
symptoms discussed with patient, will go ahead and start empiric antibiotic therapy, encouraged increased water intake, advised if urine cultures are negative symptoms may in part be due to overactive bladder especially given past history of uterine prolapse.  Encouraged doing Kegel exercises, however further recommendations for overactive bladder treatment will be pending urine culture results as this may still be simple UTI.

## 2024-02-21 LAB
BACTERIA UR CULT: ABNORMAL
ORGANISM: ABNORMAL

## 2024-04-02 ENCOUNTER — OFFICE VISIT (OUTPATIENT)
Dept: INTERNAL MEDICINE CLINIC | Age: 69
End: 2024-04-02
Payer: MEDICARE

## 2024-04-02 VITALS
BODY MASS INDEX: 23.52 KG/M2 | DIASTOLIC BLOOD PRESSURE: 74 MMHG | WEIGHT: 137 LBS | HEART RATE: 72 BPM | SYSTOLIC BLOOD PRESSURE: 126 MMHG | OXYGEN SATURATION: 95 %

## 2024-04-02 DIAGNOSIS — E03.9 ACQUIRED HYPOTHYROIDISM: ICD-10-CM

## 2024-04-02 DIAGNOSIS — J06.9 URTI (ACUTE UPPER RESPIRATORY INFECTION): Primary | ICD-10-CM

## 2024-04-02 PROBLEM — N39.0 URINARY TRACT INFECTION WITHOUT HEMATURIA: Status: RESOLVED | Noted: 2024-02-19 | Resolved: 2024-04-02

## 2024-04-02 LAB — S PYO AG THROAT QL: NORMAL

## 2024-04-02 PROCEDURE — G8420 CALC BMI NORM PARAMETERS: HCPCS | Performed by: INTERNAL MEDICINE

## 2024-04-02 PROCEDURE — 1036F TOBACCO NON-USER: CPT | Performed by: INTERNAL MEDICINE

## 2024-04-02 PROCEDURE — 3017F COLORECTAL CA SCREEN DOC REV: CPT | Performed by: INTERNAL MEDICINE

## 2024-04-02 PROCEDURE — G8400 PT W/DXA NO RESULTS DOC: HCPCS | Performed by: INTERNAL MEDICINE

## 2024-04-02 PROCEDURE — 1123F ACP DISCUSS/DSCN MKR DOCD: CPT | Performed by: INTERNAL MEDICINE

## 2024-04-02 PROCEDURE — 1090F PRES/ABSN URINE INCON ASSESS: CPT | Performed by: INTERNAL MEDICINE

## 2024-04-02 PROCEDURE — 99213 OFFICE O/P EST LOW 20 MIN: CPT | Performed by: INTERNAL MEDICINE

## 2024-04-02 PROCEDURE — G8427 DOCREV CUR MEDS BY ELIG CLIN: HCPCS | Performed by: INTERNAL MEDICINE

## 2024-04-02 PROCEDURE — 87880 STREP A ASSAY W/OPTIC: CPT | Performed by: INTERNAL MEDICINE

## 2024-04-02 RX ORDER — LEVOTHYROXINE SODIUM 0.05 MG/1
50 TABLET ORAL DAILY
Qty: 90 TABLET | Refills: 3 | Status: SHIPPED | OUTPATIENT
Start: 2024-04-02

## 2024-04-02 RX ORDER — BENZONATATE 200 MG/1
200 CAPSULE ORAL 3 TIMES DAILY PRN
Qty: 30 CAPSULE | Refills: 0 | Status: SHIPPED | OUTPATIENT
Start: 2024-04-02 | End: 2024-04-09

## 2024-04-02 ASSESSMENT — ENCOUNTER SYMPTOMS
SINUS PRESSURE: 0
SHORTNESS OF BREATH: 0
TROUBLE SWALLOWING: 0
STRIDOR: 0
SORE THROAT: 1
WHEEZING: 0
VOICE CHANGE: 1
COUGH: 1
ABDOMINAL PAIN: 0
CHEST TIGHTNESS: 0

## 2024-04-02 NOTE — ASSESSMENT & PLAN NOTE
exam with benign findings except for throat erythema, POC strep test is negative, advised patient no need for antibiotic therapy at this point as symptoms are either viral in etiology with a combination of underlying allergies with recent season change.  Recommend she starts daily antihistamine as cetirizine or loratadine, ensure adequate hydration and consume warm liquids, will try Tessalon Perles for cough suppression, encouraged to call the office if any new or worsening symptoms.

## 2024-04-02 NOTE — PROGRESS NOTES
ASSESSMENT/PLAN:  1. URTI (acute upper respiratory infection)  Assessment & Plan:    exam with benign findings except for throat erythema, POC strep test is negative, advised patient no need for antibiotic therapy at this point as symptoms are either viral in etiology with a combination of underlying allergies with recent season change.  Recommend she starts daily antihistamine as cetirizine or loratadine, ensure adequate hydration and consume warm liquids, will try Tessalon Perles for cough suppression, encouraged to call the office if any new or worsening symptoms.  Orders:  -     POCT rapid strep A  -     benzonatate (TESSALON) 200 MG capsule; Take 1 capsule by mouth 3 times daily as needed for Cough, Disp-30 capsule, R-0Normal  2. Acquired hypothyroidism  -     levothyroxine (SYNTHROID) 50 MCG tablet; Take 1 tablet by mouth daily, Disp-90 tablet, R-3Normal      Return for as scheduled.     SUBJECTIVE  HPI:   Patient complaining of cough over 3 weeks now, she works as a  and reports she had mild fever over 2 weeks ago since that subsided, she felt well except for the cough but this week started having sore throat, she is not sure if this is from all the coughing she has been doing.  She is denying any fever or chills, she does have some drainage but not through her nose, cough is productive of clear sputum.  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   HENT:  Positive for postnasal drip, sore throat and voice change. Negative for congestion, sinus pressure and trouble swallowing.    Respiratory:  Positive for cough. Negative for chest tightness, shortness of breath, wheezing and stridor.

## 2024-04-08 ENCOUNTER — PATIENT MESSAGE (OUTPATIENT)
Dept: INTERNAL MEDICINE CLINIC | Age: 69
End: 2024-04-08

## 2024-04-08 DIAGNOSIS — R05.9 COUGH, UNSPECIFIED TYPE: Primary | ICD-10-CM

## 2024-04-09 ENCOUNTER — HOSPITAL ENCOUNTER (OUTPATIENT)
Age: 69
Discharge: HOME | End: 2024-04-09
Payer: MEDICARE

## 2024-04-09 ENCOUNTER — HOSPITAL ENCOUNTER (OUTPATIENT)
Dept: GENERAL RADIOLOGY | Age: 69
Discharge: HOME | End: 2024-04-09
Payer: MEDICARE

## 2024-04-09 ENCOUNTER — OFFICE VISIT (OUTPATIENT)
Dept: INTERNAL MEDICINE CLINIC | Age: 69
End: 2024-04-09
Payer: MEDICARE

## 2024-04-09 VITALS
DIASTOLIC BLOOD PRESSURE: 72 MMHG | BODY MASS INDEX: 23.52 KG/M2 | WEIGHT: 137 LBS | SYSTOLIC BLOOD PRESSURE: 126 MMHG | HEART RATE: 69 BPM | TEMPERATURE: 97.3 F | OXYGEN SATURATION: 96 %

## 2024-04-09 DIAGNOSIS — R05.9 COUGH, UNSPECIFIED TYPE: ICD-10-CM

## 2024-04-09 DIAGNOSIS — J06.9 URTI (ACUTE UPPER RESPIRATORY INFECTION): Primary | ICD-10-CM

## 2024-04-09 PROCEDURE — 1036F TOBACCO NON-USER: CPT | Performed by: INTERNAL MEDICINE

## 2024-04-09 PROCEDURE — 1090F PRES/ABSN URINE INCON ASSESS: CPT | Performed by: INTERNAL MEDICINE

## 2024-04-09 PROCEDURE — 1123F ACP DISCUSS/DSCN MKR DOCD: CPT | Performed by: INTERNAL MEDICINE

## 2024-04-09 PROCEDURE — 3017F COLORECTAL CA SCREEN DOC REV: CPT | Performed by: INTERNAL MEDICINE

## 2024-04-09 PROCEDURE — G8400 PT W/DXA NO RESULTS DOC: HCPCS | Performed by: INTERNAL MEDICINE

## 2024-04-09 PROCEDURE — G8427 DOCREV CUR MEDS BY ELIG CLIN: HCPCS | Performed by: INTERNAL MEDICINE

## 2024-04-09 PROCEDURE — G8420 CALC BMI NORM PARAMETERS: HCPCS | Performed by: INTERNAL MEDICINE

## 2024-04-09 PROCEDURE — 71046 X-RAY EXAM CHEST 2 VIEWS: CPT

## 2024-04-09 PROCEDURE — 99213 OFFICE O/P EST LOW 20 MIN: CPT | Performed by: INTERNAL MEDICINE

## 2024-04-09 RX ORDER — AZITHROMYCIN 250 MG/1
TABLET, FILM COATED ORAL
Qty: 6 TABLET | Refills: 0 | Status: SHIPPED | OUTPATIENT
Start: 2024-04-09 | End: 2024-04-19

## 2024-04-09 ASSESSMENT — ENCOUNTER SYMPTOMS
SHORTNESS OF BREATH: 0
CHEST TIGHTNESS: 0
COUGH: 1
ABDOMINAL PAIN: 0
SORE THROAT: 1
COLOR CHANGE: 0
NAUSEA: 0
WHEEZING: 0
VOMITING: 0
BACK PAIN: 0
VOICE CHANGE: 1
CONSTIPATION: 0

## 2024-04-09 ASSESSMENT — PATIENT HEALTH QUESTIONNAIRE - PHQ9
1. LITTLE INTEREST OR PLEASURE IN DOING THINGS: NOT AT ALL
SUM OF ALL RESPONSES TO PHQ QUESTIONS 1-9: 0
SUM OF ALL RESPONSES TO PHQ9 QUESTIONS 1 & 2: 0
2. FEELING DOWN, DEPRESSED OR HOPELESS: NOT AT ALL
SUM OF ALL RESPONSES TO PHQ QUESTIONS 1-9: 0

## 2024-04-09 NOTE — ASSESSMENT & PLAN NOTE
patient still complaining of cough and hoarseness in voice for 3 weeks now despite trying over-the-counter medications and cough suppressant.  X-ray ordered to rule out pneumonia since patient complaining of side pain on the right although lung auscultation is negative.  Will also go ahead and start azithromycin to cover atypical pneumonia given patient's high anxiety about her symptoms.  Encouraged to continue to ensure adequate hydration, continue Mucinex at bedtime since giving her some relief  
yes

## 2024-04-09 NOTE — TELEPHONE ENCOUNTER
From: Alyssa Abarca  To: Dr. Gracie Hdz  Sent: 4/8/2024 4:36 PM EDT  Subject: throat and chest pain    Dr. Hdz,   I really am not feeling much better and continue to have the coughing and throat and chest pain. I've been taking the allergy meds quite regularly and the cough medicine and Yesterday I was talking to my daughter who is a nurse in [place] and she said, Mom I'd feel better if you could have a chest x-ray.

## 2024-04-09 NOTE — PROGRESS NOTES
ASSESSMENT/PLAN:  1. URTI (acute upper respiratory infection)  Assessment & Plan:    patient still complaining of cough and hoarseness in voice for 3 weeks now despite trying over-the-counter medications and cough suppressant.  X-ray ordered to rule out pneumonia since patient complaining of side pain on the right although lung auscultation is negative.  Will also go ahead and start azithromycin to cover atypical pneumonia given patient's high anxiety about her symptoms.  Encouraged to continue to ensure adequate hydration, continue Mucinex at bedtime since giving her some relief  Orders:  -     azithromycin (ZITHROMAX) 250 MG tablet; 500mg on day 1 followed by 250mg on days 2 - 5, Disp-6 tablet, R-0Normal      Return for as scheduled.     SUBJECTIVE  HPI:   Patient seen recently for cough and respiratory symptoms, was recommended supportive care measures, reports she has not been feeling any better and continues to have lingering cough, feels her ribs are aching, her daughter is a nurse and told her she should get a chest x-ray to rule out pneumonia.  She continues to deny any fever or chills, cough is mostly dry or with whitish sputum.  Feels sore throat and swollen glands however no abnormalities on exam        Review of Systems   Constitutional:  Negative for activity change, appetite change and fatigue.   HENT:  Positive for sore throat and voice change. Negative for congestion, hearing loss and mouth sores.    Respiratory:  Positive for cough. Negative for chest tightness, shortness of breath and wheezing.    Cardiovascular:  Negative for chest pain, palpitations and leg swelling.   Gastrointestinal:  Negative for abdominal pain, constipation, nausea and vomiting.   Genitourinary:  Negative for difficulty urinating, dysuria, frequency, hematuria and urgency.   Musculoskeletal:  Negative for arthralgias, back pain, gait problem and joint swelling.   Skin:  Negative for color change.   Allergic/Immunologic:

## 2024-04-23 ENCOUNTER — PATIENT MESSAGE (OUTPATIENT)
Dept: INTERNAL MEDICINE CLINIC | Age: 69
End: 2024-04-23

## 2024-04-23 DIAGNOSIS — R49.0 HOARSENESS OF VOICE: ICD-10-CM

## 2024-04-23 DIAGNOSIS — R91.8 RIGHT LOWER LOBE PULMONARY INFILTRATE: Primary | ICD-10-CM

## 2024-04-24 PROBLEM — J06.9 URTI (ACUTE UPPER RESPIRATORY INFECTION): Status: RESOLVED | Noted: 2024-04-02 | Resolved: 2024-04-24

## 2024-04-24 PROBLEM — R91.8 RIGHT LOWER LOBE PULMONARY INFILTRATE: Status: ACTIVE | Noted: 2024-04-24

## 2024-04-24 PROBLEM — R49.0 HOARSENESS OF VOICE: Status: ACTIVE | Noted: 2024-04-24

## 2024-04-24 NOTE — TELEPHONE ENCOUNTER
Will place referral to ENT for hoarseness of voice, chest x-ray recheck should be in 4 to 6 weeks from initial  one that was done April 9.  Orders placed

## 2024-04-24 NOTE — TELEPHONE ENCOUNTER
From: Alyssa Abarca  To: Dr. Gracie Hdz  Sent: 4/23/2024 7:16 PM EDT  Subject: lost voice    It's been two weeks since I saw you and I am feeling better since taking the antibiotic z-pack, and even my chest pain has become less. However, I still do not have my voice and I keep thinking it will get better. I did go into work all of last week and am working this week as well thinking that my voice will improve but it is not improving. Also, has the follow up x-ray been ordered?  Thank you!

## 2024-05-21 ENCOUNTER — HOSPITAL ENCOUNTER (OUTPATIENT)
Dept: GENERAL RADIOLOGY | Age: 69
Discharge: HOME | End: 2024-05-21
Payer: MEDICARE

## 2024-05-21 ENCOUNTER — HOSPITAL ENCOUNTER (OUTPATIENT)
Age: 69
Discharge: HOME | End: 2024-05-21
Payer: MEDICARE

## 2024-05-21 DIAGNOSIS — R91.8 RIGHT LOWER LOBE PULMONARY INFILTRATE: ICD-10-CM

## 2024-05-21 PROCEDURE — 71046 X-RAY EXAM CHEST 2 VIEWS: CPT

## 2024-05-23 ENCOUNTER — TELEPHONE (OUTPATIENT)
Dept: INTERNAL MEDICINE CLINIC | Age: 69
End: 2024-05-23

## 2024-06-06 ENCOUNTER — OFFICE VISIT (OUTPATIENT)
Dept: INTERNAL MEDICINE CLINIC | Age: 69
End: 2024-06-06
Payer: MEDICARE

## 2024-06-06 VITALS
OXYGEN SATURATION: 98 % | DIASTOLIC BLOOD PRESSURE: 78 MMHG | SYSTOLIC BLOOD PRESSURE: 120 MMHG | HEIGHT: 64 IN | HEART RATE: 61 BPM | WEIGHT: 143.4 LBS | BODY MASS INDEX: 24.48 KG/M2

## 2024-06-06 DIAGNOSIS — R73.01 IMPAIRED FASTING BLOOD SUGAR: ICD-10-CM

## 2024-06-06 DIAGNOSIS — Z00.00 MEDICARE ANNUAL WELLNESS VISIT, SUBSEQUENT: Primary | ICD-10-CM

## 2024-06-06 DIAGNOSIS — E03.9 ACQUIRED HYPOTHYROIDISM: ICD-10-CM

## 2024-06-06 DIAGNOSIS — E78.2 MIXED HYPERLIPIDEMIA: ICD-10-CM

## 2024-06-06 PROBLEM — L60.3 NAIL BREAKING: Status: RESOLVED | Noted: 2023-06-02 | Resolved: 2024-06-06

## 2024-06-06 PROBLEM — L29.9 ITCHING OF EAR: Status: RESOLVED | Noted: 2023-06-02 | Resolved: 2024-06-06

## 2024-06-06 PROCEDURE — 3017F COLORECTAL CA SCREEN DOC REV: CPT | Performed by: INTERNAL MEDICINE

## 2024-06-06 PROCEDURE — 1123F ACP DISCUSS/DSCN MKR DOCD: CPT | Performed by: INTERNAL MEDICINE

## 2024-06-06 PROCEDURE — G0439 PPPS, SUBSEQ VISIT: HCPCS | Performed by: INTERNAL MEDICINE

## 2024-06-06 SDOH — ECONOMIC STABILITY: FOOD INSECURITY: WITHIN THE PAST 12 MONTHS, THE FOOD YOU BOUGHT JUST DIDN'T LAST AND YOU DIDN'T HAVE MONEY TO GET MORE.: NEVER TRUE

## 2024-06-06 SDOH — ECONOMIC STABILITY: FOOD INSECURITY: WITHIN THE PAST 12 MONTHS, YOU WORRIED THAT YOUR FOOD WOULD RUN OUT BEFORE YOU GOT MONEY TO BUY MORE.: NEVER TRUE

## 2024-06-06 SDOH — ECONOMIC STABILITY: INCOME INSECURITY: HOW HARD IS IT FOR YOU TO PAY FOR THE VERY BASICS LIKE FOOD, HOUSING, MEDICAL CARE, AND HEATING?: NOT HARD AT ALL

## 2024-06-06 ASSESSMENT — ENCOUNTER SYMPTOMS
ABDOMINAL PAIN: 0
BACK PAIN: 1
WHEEZING: 0
NAUSEA: 0
SORE THROAT: 0
SINUS PRESSURE: 0
CHEST TIGHTNESS: 0
COLOR CHANGE: 0
CONSTIPATION: 0
VOMITING: 0
SHORTNESS OF BREATH: 0
COUGH: 0

## 2024-06-06 ASSESSMENT — PATIENT HEALTH QUESTIONNAIRE - PHQ9
SUM OF ALL RESPONSES TO PHQ QUESTIONS 1-9: 0
SUM OF ALL RESPONSES TO PHQ9 QUESTIONS 1 & 2: 0
2. FEELING DOWN, DEPRESSED OR HOPELESS: NOT AT ALL
1. LITTLE INTEREST OR PLEASURE IN DOING THINGS: NOT AT ALL

## 2024-06-20 DIAGNOSIS — R73.01 IMPAIRED FASTING BLOOD SUGAR: ICD-10-CM

## 2024-06-20 DIAGNOSIS — E78.2 MIXED HYPERLIPIDEMIA: ICD-10-CM

## 2024-06-20 DIAGNOSIS — E03.9 ACQUIRED HYPOTHYROIDISM: ICD-10-CM

## 2024-06-20 LAB
DEPRECATED RDW RBC AUTO: 14.1 % (ref 12.4–15.4)
HCT VFR BLD AUTO: 38.8 % (ref 36–48)
HGB BLD-MCNC: 13 G/DL (ref 12–16)
MCH RBC QN AUTO: 30.7 PG (ref 26–34)
MCHC RBC AUTO-ENTMCNC: 33.7 G/DL (ref 31–36)
MCV RBC AUTO: 91.1 FL (ref 80–100)
PLATELET # BLD AUTO: 255 K/UL (ref 135–450)
PMV BLD AUTO: 10.1 FL (ref 5–10.5)
RBC # BLD AUTO: 4.25 M/UL (ref 4–5.2)
WBC # BLD AUTO: 5.4 K/UL (ref 4–11)

## 2024-06-21 LAB
ALBUMIN SERPL-MCNC: 4.7 G/DL (ref 3.4–5)
ALBUMIN/GLOB SERPL: 1.9 {RATIO} (ref 1.1–2.2)
ALP SERPL-CCNC: 65 U/L (ref 40–129)
ALT SERPL-CCNC: 16 U/L (ref 10–40)
ANION GAP SERPL CALCULATED.3IONS-SCNC: 12 MMOL/L (ref 3–16)
AST SERPL-CCNC: 27 U/L (ref 15–37)
BILIRUB SERPL-MCNC: 0.4 MG/DL (ref 0–1)
BUN SERPL-MCNC: 11 MG/DL (ref 7–20)
CALCIUM SERPL-MCNC: 9.7 MG/DL (ref 8.3–10.6)
CHLORIDE SERPL-SCNC: 102 MMOL/L (ref 99–110)
CHOLEST SERPL-MCNC: 234 MG/DL (ref 0–199)
CO2 SERPL-SCNC: 27 MMOL/L (ref 21–32)
CREAT SERPL-MCNC: 0.6 MG/DL (ref 0.6–1.2)
EST. AVERAGE GLUCOSE BLD GHB EST-MCNC: 114 MG/DL
GFR SERPLBLD CREATININE-BSD FMLA CKD-EPI: >90 ML/MIN/{1.73_M2}
GLUCOSE SERPL-MCNC: 78 MG/DL (ref 70–99)
HBA1C MFR BLD: 5.6 %
HDLC SERPL-MCNC: 74 MG/DL (ref 40–60)
LDLC SERPL CALC-MCNC: 139 MG/DL
POTASSIUM SERPL-SCNC: 4.1 MMOL/L (ref 3.5–5.1)
PROT SERPL-MCNC: 7.2 G/DL (ref 6.4–8.2)
SODIUM SERPL-SCNC: 141 MMOL/L (ref 136–145)
TRIGL SERPL-MCNC: 105 MG/DL (ref 0–150)
TSH SERPL DL<=0.005 MIU/L-ACNC: 3.61 UIU/ML (ref 0.27–4.2)
VLDLC SERPL CALC-MCNC: 21 MG/DL

## 2024-07-06 PROBLEM — Z00.00 MEDICARE ANNUAL WELLNESS VISIT, SUBSEQUENT: Status: RESOLVED | Noted: 2021-08-17 | Resolved: 2024-07-06

## 2024-10-16 ENCOUNTER — HOSPITAL ENCOUNTER (OUTPATIENT)
Dept: WOMENS IMAGING | Age: 69
Discharge: HOME | End: 2024-10-16
Payer: MEDICARE

## 2024-10-16 VITALS — HEIGHT: 64 IN | WEIGHT: 143 LBS | BODY MASS INDEX: 24.41 KG/M2

## 2024-10-16 DIAGNOSIS — Z12.31 ENCOUNTER FOR SCREENING MAMMOGRAM FOR MALIGNANT NEOPLASM OF BREAST: ICD-10-CM

## 2024-10-16 PROCEDURE — 77063 BREAST TOMOSYNTHESIS BI: CPT

## 2024-12-05 ENCOUNTER — OFFICE VISIT (OUTPATIENT)
Dept: INTERNAL MEDICINE CLINIC | Age: 69
End: 2024-12-05
Payer: MEDICARE

## 2024-12-05 VITALS
BODY MASS INDEX: 24.68 KG/M2 | HEART RATE: 62 BPM | OXYGEN SATURATION: 96 % | DIASTOLIC BLOOD PRESSURE: 78 MMHG | WEIGHT: 143.8 LBS | SYSTOLIC BLOOD PRESSURE: 120 MMHG

## 2024-12-05 DIAGNOSIS — E78.2 MIXED HYPERLIPIDEMIA: ICD-10-CM

## 2024-12-05 DIAGNOSIS — E03.9 ACQUIRED HYPOTHYROIDISM: Primary | ICD-10-CM

## 2024-12-05 PROBLEM — R91.8 RIGHT LOWER LOBE PULMONARY INFILTRATE: Status: RESOLVED | Noted: 2024-04-24 | Resolved: 2024-12-05

## 2024-12-05 PROCEDURE — 1123F ACP DISCUSS/DSCN MKR DOCD: CPT | Performed by: INTERNAL MEDICINE

## 2024-12-05 PROCEDURE — G8420 CALC BMI NORM PARAMETERS: HCPCS | Performed by: INTERNAL MEDICINE

## 2024-12-05 PROCEDURE — 1090F PRES/ABSN URINE INCON ASSESS: CPT | Performed by: INTERNAL MEDICINE

## 2024-12-05 PROCEDURE — G8427 DOCREV CUR MEDS BY ELIG CLIN: HCPCS | Performed by: INTERNAL MEDICINE

## 2024-12-05 PROCEDURE — 1036F TOBACCO NON-USER: CPT | Performed by: INTERNAL MEDICINE

## 2024-12-05 PROCEDURE — G8400 PT W/DXA NO RESULTS DOC: HCPCS | Performed by: INTERNAL MEDICINE

## 2024-12-05 PROCEDURE — 1159F MED LIST DOCD IN RCRD: CPT | Performed by: INTERNAL MEDICINE

## 2024-12-05 PROCEDURE — 99214 OFFICE O/P EST MOD 30 MIN: CPT | Performed by: INTERNAL MEDICINE

## 2024-12-05 PROCEDURE — 3017F COLORECTAL CA SCREEN DOC REV: CPT | Performed by: INTERNAL MEDICINE

## 2024-12-05 PROCEDURE — G8482 FLU IMMUNIZE ORDER/ADMIN: HCPCS | Performed by: INTERNAL MEDICINE

## 2024-12-05 PROCEDURE — 1160F RVW MEDS BY RX/DR IN RCRD: CPT | Performed by: INTERNAL MEDICINE

## 2024-12-05 ASSESSMENT — ENCOUNTER SYMPTOMS
COLOR CHANGE: 0
SHORTNESS OF BREATH: 0
COUGH: 0
SORE THROAT: 0
VOMITING: 0
ABDOMINAL PAIN: 0
NAUSEA: 0
BACK PAIN: 0
CHEST TIGHTNESS: 0
CONSTIPATION: 0
WHEEZING: 0

## 2024-12-05 NOTE — ASSESSMENT & PLAN NOTE
reports adherence to healthy diet and active lifestyle, encouraged to continue same, will recheck labs next visit she continues to have excellent HDL

## 2024-12-05 NOTE — PROGRESS NOTES
ASSESSMENT/PLAN:  1. Acquired hypothyroidism  Assessment & Plan:  Doing well on levothyroxine, compliance verified, will update labs next visit, continue same   2. Mixed hyperlipidemia  Assessment & Plan:    reports adherence to healthy diet and active lifestyle, encouraged to continue same, will recheck labs next visit she continues to have excellent HDL      Return in about 6 months (around 6/5/2025) for AWV.     SUBJECTIVE  HPI:   Here for 6-month follow-up on chronic medical problems.  Doing well and denies any concerns, reports she got her flu vaccine at her pharmacy in October        Review of Systems   Constitutional:  Negative for activity change, appetite change and fatigue.   HENT:  Negative for congestion, hearing loss, mouth sores and sore throat.    Respiratory:  Negative for cough, chest tightness, shortness of breath and wheezing.    Cardiovascular:  Negative for chest pain, palpitations and leg swelling.   Gastrointestinal:  Negative for abdominal pain, constipation, nausea and vomiting.   Genitourinary:  Negative for difficulty urinating, dysuria, frequency, hematuria and urgency.   Musculoskeletal:  Negative for arthralgias, back pain, gait problem and joint swelling.   Skin:  Negative for color change.   Allergic/Immunologic: Negative for environmental allergies and immunocompromised state.   Neurological:  Negative for dizziness, light-headedness and headaches.   Psychiatric/Behavioral:  Negative for behavioral problems and dysphoric mood.        OBJECTIVE:    /78   Pulse 62   Wt 65.2 kg (143 lb 12.8 oz)   SpO2 96%   BMI 24.68 kg/m²    Physical Exam  Vitals and nursing note reviewed.   Constitutional:       General: She is not in acute distress.     Appearance: Normal appearance. She is normal weight. She is not toxic-appearing.   HENT:      Head: Normocephalic.   Eyes:      Conjunctiva/sclera: Conjunctivae normal.   Cardiovascular:      Rate and Rhythm: Normal rate.      Heart sounds:

## 2025-03-19 DIAGNOSIS — E03.9 ACQUIRED HYPOTHYROIDISM: ICD-10-CM

## 2025-03-19 RX ORDER — LEVOTHYROXINE SODIUM 50 UG/1
50 TABLET ORAL DAILY
Qty: 90 TABLET | Refills: 3 | Status: SHIPPED | OUTPATIENT
Start: 2025-03-19

## 2025-06-24 ENCOUNTER — OFFICE VISIT (OUTPATIENT)
Dept: INTERNAL MEDICINE CLINIC | Age: 70
End: 2025-06-24
Payer: MEDICARE

## 2025-06-24 VITALS
HEART RATE: 60 BPM | BODY MASS INDEX: 24.72 KG/M2 | SYSTOLIC BLOOD PRESSURE: 134 MMHG | DIASTOLIC BLOOD PRESSURE: 78 MMHG | OXYGEN SATURATION: 96 % | WEIGHT: 144 LBS

## 2025-06-24 DIAGNOSIS — E78.2 MIXED HYPERLIPIDEMIA: ICD-10-CM

## 2025-06-24 DIAGNOSIS — E03.9 ACQUIRED HYPOTHYROIDISM: ICD-10-CM

## 2025-06-24 DIAGNOSIS — R73.01 IMPAIRED FASTING BLOOD SUGAR: ICD-10-CM

## 2025-06-24 DIAGNOSIS — Z00.00 MEDICARE ANNUAL WELLNESS VISIT, SUBSEQUENT: Primary | ICD-10-CM

## 2025-06-24 DIAGNOSIS — R05.9 COUGH, UNSPECIFIED TYPE: ICD-10-CM

## 2025-06-24 LAB
ALBUMIN SERPL-MCNC: 4.4 G/DL (ref 3.4–5)
ALBUMIN/GLOB SERPL: 2 {RATIO} (ref 1.1–2.2)
ALP SERPL-CCNC: 52 U/L (ref 40–129)
ALT SERPL-CCNC: 17 U/L (ref 10–40)
ANION GAP SERPL CALCULATED.3IONS-SCNC: 11 MMOL/L (ref 3–16)
AST SERPL-CCNC: 25 U/L (ref 15–37)
BILIRUB SERPL-MCNC: 0.4 MG/DL (ref 0–1)
BUN SERPL-MCNC: 10 MG/DL (ref 7–20)
CALCIUM SERPL-MCNC: 9.5 MG/DL (ref 8.3–10.6)
CHLORIDE SERPL-SCNC: 103 MMOL/L (ref 99–110)
CHOLEST SERPL-MCNC: 243 MG/DL (ref 0–199)
CO2 SERPL-SCNC: 27 MMOL/L (ref 21–32)
CREAT SERPL-MCNC: 0.7 MG/DL (ref 0.6–1.2)
DEPRECATED RDW RBC AUTO: 13.8 % (ref 12.4–15.4)
EST. AVERAGE GLUCOSE BLD GHB EST-MCNC: 122.6 MG/DL
GFR SERPLBLD CREATININE-BSD FMLA CKD-EPI: >90 ML/MIN/{1.73_M2}
GLUCOSE SERPL-MCNC: 85 MG/DL (ref 70–99)
HBA1C MFR BLD: 5.9 %
HCT VFR BLD AUTO: 39.6 % (ref 36–48)
HDLC SERPL-MCNC: 68 MG/DL (ref 40–60)
HGB BLD-MCNC: 13.4 G/DL (ref 12–16)
LDLC SERPL CALC-MCNC: 153 MG/DL
MCH RBC QN AUTO: 30.7 PG (ref 26–34)
MCHC RBC AUTO-ENTMCNC: 33.8 G/DL (ref 31–36)
MCV RBC AUTO: 90.9 FL (ref 80–100)
PLATELET # BLD AUTO: 243 K/UL (ref 135–450)
PMV BLD AUTO: 9.5 FL (ref 5–10.5)
POTASSIUM SERPL-SCNC: 4.3 MMOL/L (ref 3.5–5.1)
PROT SERPL-MCNC: 6.6 G/DL (ref 6.4–8.2)
RBC # BLD AUTO: 4.35 M/UL (ref 4–5.2)
SODIUM SERPL-SCNC: 141 MMOL/L (ref 136–145)
T4 FREE SERPL-MCNC: 1.1 NG/DL (ref 0.9–1.8)
TRIGL SERPL-MCNC: 112 MG/DL (ref 0–150)
TSH SERPL DL<=0.005 MIU/L-ACNC: 5.04 UIU/ML (ref 0.27–4.2)
VLDLC SERPL CALC-MCNC: 22 MG/DL
WBC # BLD AUTO: 4.3 K/UL (ref 4–11)

## 2025-06-24 PROCEDURE — G8400 PT W/DXA NO RESULTS DOC: HCPCS | Performed by: INTERNAL MEDICINE

## 2025-06-24 PROCEDURE — G8420 CALC BMI NORM PARAMETERS: HCPCS | Performed by: INTERNAL MEDICINE

## 2025-06-24 PROCEDURE — 1123F ACP DISCUSS/DSCN MKR DOCD: CPT | Performed by: INTERNAL MEDICINE

## 2025-06-24 PROCEDURE — 99214 OFFICE O/P EST MOD 30 MIN: CPT | Performed by: INTERNAL MEDICINE

## 2025-06-24 PROCEDURE — 1090F PRES/ABSN URINE INCON ASSESS: CPT | Performed by: INTERNAL MEDICINE

## 2025-06-24 PROCEDURE — 1036F TOBACCO NON-USER: CPT | Performed by: INTERNAL MEDICINE

## 2025-06-24 PROCEDURE — G8427 DOCREV CUR MEDS BY ELIG CLIN: HCPCS | Performed by: INTERNAL MEDICINE

## 2025-06-24 PROCEDURE — 3017F COLORECTAL CA SCREEN DOC REV: CPT | Performed by: INTERNAL MEDICINE

## 2025-06-24 PROCEDURE — 1160F RVW MEDS BY RX/DR IN RCRD: CPT | Performed by: INTERNAL MEDICINE

## 2025-06-24 PROCEDURE — G0439 PPPS, SUBSEQ VISIT: HCPCS | Performed by: INTERNAL MEDICINE

## 2025-06-24 PROCEDURE — 1159F MED LIST DOCD IN RCRD: CPT | Performed by: INTERNAL MEDICINE

## 2025-06-24 SDOH — ECONOMIC STABILITY: FOOD INSECURITY: WITHIN THE PAST 12 MONTHS, YOU WORRIED THAT YOUR FOOD WOULD RUN OUT BEFORE YOU GOT MONEY TO BUY MORE.: NEVER TRUE

## 2025-06-24 SDOH — ECONOMIC STABILITY: FOOD INSECURITY: WITHIN THE PAST 12 MONTHS, THE FOOD YOU BOUGHT JUST DIDN'T LAST AND YOU DIDN'T HAVE MONEY TO GET MORE.: NEVER TRUE

## 2025-06-24 ASSESSMENT — ENCOUNTER SYMPTOMS
BACK PAIN: 0
RHINORRHEA: 0
VOMITING: 0
NAUSEA: 0
COUGH: 1
COLOR CHANGE: 0
WHEEZING: 0
CONSTIPATION: 0
ABDOMINAL PAIN: 0
SINUS PAIN: 0
CHEST TIGHTNESS: 0
SHORTNESS OF BREATH: 0
SORE THROAT: 0

## 2025-06-24 ASSESSMENT — PATIENT HEALTH QUESTIONNAIRE - PHQ9
SUM OF ALL RESPONSES TO PHQ QUESTIONS 1-9: 0
2. FEELING DOWN, DEPRESSED OR HOPELESS: NOT AT ALL
1. LITTLE INTEREST OR PLEASURE IN DOING THINGS: NOT AT ALL

## 2025-06-24 ASSESSMENT — LIFESTYLE VARIABLES
HOW MANY STANDARD DRINKS CONTAINING ALCOHOL DO YOU HAVE ON A TYPICAL DAY: 1 OR 2
HOW OFTEN DO YOU HAVE A DRINK CONTAINING ALCOHOL: MONTHLY OR LESS

## 2025-06-24 NOTE — PROGRESS NOTES
Medicare Annual Wellness Visit  Name: Alyssa Abarca Today’s Date: 2025   MRN: [id number] Sex: Female   Age: 70 y.o. Ethnicity: Non- / Non    : 1955 Race: White (non-)      Alyssa Abarca is here for Medicare AWV     Screenings for behavioral, psychosocial and functional/safety risks, and cognitive dysfunction are all negative except as indicated below. These results, as well as other patient data from the Health Risk Assessment form, are documented in Flowsheets linked to this Encounter.    No Known Allergies    Prior to Visit Medications    Medication Sig Taking? Authorizing Provider   levothyroxine (SYNTHROID) 50 MCG tablet TAKE 1 TABLET DAILY Yes Gracie Hdz MD       Past Medical History:   Diagnosis Date    Hyperlipidemia     Hypothyroidism      Past Surgical History:   Procedure Laterality Date    DILATION AND CURETTAGE OF UTERUS      OTHER SURGICAL HISTORY  2019    pelvic prolapse       Family History   Problem Relation Age of Onset    Cancer Mother         Kidney    Alzheimer's Disease Father        CareTeam (Including outside providers/suppliers regularly involved in providing care):   Patient Care Team:  Gracie Hdz MD as PCP - General (Internal Medicine)  Gracie Hdz MD as PCP - Empaneled Provider    Wt Readings from Last 3 Encounters:   25 65.3 kg (144 lb)   24 65.2 kg (143 lb 12.8 oz)   10/16/24 64.9 kg (143 lb)     Vitals:    25 0916   BP: 134/78   BP Site: Left Upper Arm   Patient Position: Sitting   BP Cuff Size: Medium Adult   Pulse: 60   SpO2: 96%   Weight: 65.3 kg (144 lb)     Body mass index is 24.72 kg/m².    Based upon direct observation of the patient, evaluation of cognition reveals recent and remote memory intact.    Review of Systems   Constitutional:  Negative for activity change, appetite change, fatigue and unexpected weight change.   HENT:  Negative for congestion, ear discharge, hearing loss, mouth sores, rhinorrhea,

## 2025-06-24 NOTE — ASSESSMENT & PLAN NOTE
Doing well on current dose of levothyroxine, update labs and adjust dose if needed otherwise continue same

## 2025-06-24 NOTE — ASSESSMENT & PLAN NOTE
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  Acute symptoms subsided, she does admit to seasonal allergies but does not take medications.  Advised can do as needed meds for occasional flareups if she does not want to take daily medications, continue to avoid smoke and other known triggers

## 2025-06-25 ENCOUNTER — RESULTS FOLLOW-UP (OUTPATIENT)
Dept: INTERNAL MEDICINE CLINIC | Age: 70
End: 2025-06-25

## 2025-07-24 PROBLEM — R05.9 COUGH: Status: RESOLVED | Noted: 2025-06-24 | Resolved: 2025-07-24
